# Patient Record
Sex: MALE | Race: WHITE | NOT HISPANIC OR LATINO | Employment: OTHER | URBAN - NONMETROPOLITAN AREA
[De-identification: names, ages, dates, MRNs, and addresses within clinical notes are randomized per-mention and may not be internally consistent; named-entity substitution may affect disease eponyms.]

---

## 2017-02-04 ENCOUNTER — HOSPITAL ENCOUNTER (OUTPATIENT)
Facility: HOSPITAL | Age: 67
Setting detail: OBSERVATION
Discharge: HOME | End: 2017-02-07
Attending: UROLOGY | Admitting: UROLOGY

## 2017-02-04 ENCOUNTER — APPOINTMENT (OUTPATIENT)
Dept: CT IMAGING | Facility: HOSPITAL | Age: 67
End: 2017-02-04

## 2017-02-04 DIAGNOSIS — L53.9 ERYTHEMA: ICD-10-CM

## 2017-02-04 DIAGNOSIS — R31.0 GROSS HEMATURIA: Primary | ICD-10-CM

## 2017-02-04 PROCEDURE — 25010000003 CEFAZOLIN PER 500 MG: Performed by: UROLOGY

## 2017-02-04 PROCEDURE — G0378 HOSPITAL OBSERVATION PER HR: HCPCS

## 2017-02-04 PROCEDURE — 99220 PR INITIAL OBSERVATION CARE/DAY 70 MINUTES: CPT | Performed by: UROLOGY

## 2017-02-04 PROCEDURE — 74176 CT ABD & PELVIS W/O CONTRAST: CPT

## 2017-02-04 RX ORDER — PROMETHAZINE HYDROCHLORIDE 25 MG/ML
12.5 INJECTION, SOLUTION INTRAMUSCULAR; INTRAVENOUS EVERY 4 HOURS PRN
Status: DISCONTINUED | OUTPATIENT
Start: 2017-02-04 | End: 2017-02-07 | Stop reason: HOSPADM

## 2017-02-04 RX ORDER — TAMSULOSIN HYDROCHLORIDE 0.4 MG/1
1 CAPSULE ORAL NIGHTLY
COMMUNITY

## 2017-02-04 RX ORDER — PROMETHAZINE HYDROCHLORIDE 25 MG/ML
12.5 INJECTION, SOLUTION INTRAMUSCULAR; INTRAVENOUS EVERY 6 HOURS PRN
Status: DISCONTINUED | OUTPATIENT
Start: 2017-02-04 | End: 2017-02-07 | Stop reason: HOSPADM

## 2017-02-04 RX ORDER — HYDROCODONE BITARTRATE AND ACETAMINOPHEN 7.5; 325 MG/1; MG/1
1 TABLET ORAL EVERY 4 HOURS PRN
Status: DISCONTINUED | OUTPATIENT
Start: 2017-02-04 | End: 2017-02-07 | Stop reason: HOSPADM

## 2017-02-04 RX ORDER — ATROPA BELLADONNA AND OPIUM 16.2; 6 MG/1; MG/1
60 SUPPOSITORY RECTAL EVERY 6 HOURS PRN
Status: DISCONTINUED | OUTPATIENT
Start: 2017-02-04 | End: 2017-02-07 | Stop reason: HOSPADM

## 2017-02-04 RX ORDER — SODIUM CHLORIDE 9 MG/ML
25 INJECTION, SOLUTION INTRAVENOUS CONTINUOUS
Status: DISCONTINUED | OUTPATIENT
Start: 2017-02-04 | End: 2017-02-07 | Stop reason: HOSPADM

## 2017-02-04 RX ORDER — HYDROCODONE BITARTRATE AND ACETAMINOPHEN 7.5; 325 MG/1; MG/1
1 TABLET ORAL EVERY 6 HOURS PRN
Status: DISCONTINUED | OUTPATIENT
Start: 2017-02-04 | End: 2017-02-07 | Stop reason: HOSPADM

## 2017-02-04 RX ORDER — MORPHINE SULFATE 4 MG/ML
4 INJECTION, SOLUTION INTRAMUSCULAR; INTRAVENOUS
Status: DISCONTINUED | OUTPATIENT
Start: 2017-02-04 | End: 2017-02-07 | Stop reason: HOSPADM

## 2017-02-04 RX ORDER — ZOLPIDEM TARTRATE 5 MG/1
5 TABLET ORAL NIGHTLY PRN
Status: DISCONTINUED | OUTPATIENT
Start: 2017-02-04 | End: 2017-02-07 | Stop reason: HOSPADM

## 2017-02-04 RX ORDER — NALOXONE HCL 0.4 MG/ML
0.4 VIAL (ML) INJECTION
Status: DISCONTINUED | OUTPATIENT
Start: 2017-02-04 | End: 2017-02-07 | Stop reason: HOSPADM

## 2017-02-04 RX ORDER — ONDANSETRON 4 MG/1
4 TABLET, FILM COATED ORAL EVERY 6 HOURS PRN
Status: DISCONTINUED | OUTPATIENT
Start: 2017-02-04 | End: 2017-02-07 | Stop reason: HOSPADM

## 2017-02-04 RX ORDER — HYDROCODONE BITARTRATE AND ACETAMINOPHEN 7.5; 325 MG/1; MG/1
2 TABLET ORAL EVERY 4 HOURS PRN
Status: DISCONTINUED | OUTPATIENT
Start: 2017-02-04 | End: 2017-02-07 | Stop reason: HOSPADM

## 2017-02-04 RX ORDER — BISACODYL 10 MG
10 SUPPOSITORY, RECTAL RECTAL DAILY PRN
Status: DISCONTINUED | OUTPATIENT
Start: 2017-02-04 | End: 2017-02-07 | Stop reason: HOSPADM

## 2017-02-04 RX ORDER — ONDANSETRON 2 MG/ML
4 INJECTION INTRAMUSCULAR; INTRAVENOUS EVERY 6 HOURS PRN
Status: DISCONTINUED | OUTPATIENT
Start: 2017-02-04 | End: 2017-02-07 | Stop reason: HOSPADM

## 2017-02-04 RX ORDER — DOCUSATE SODIUM 100 MG/1
100 CAPSULE, LIQUID FILLED ORAL 2 TIMES DAILY PRN
Status: DISCONTINUED | OUTPATIENT
Start: 2017-02-04 | End: 2017-02-07 | Stop reason: HOSPADM

## 2017-02-04 RX ORDER — ACETAMINOPHEN 325 MG/1
650 TABLET ORAL EVERY 4 HOURS PRN
Status: DISCONTINUED | OUTPATIENT
Start: 2017-02-04 | End: 2017-02-07 | Stop reason: HOSPADM

## 2017-02-04 RX ORDER — TAMSULOSIN HYDROCHLORIDE 0.4 MG/1
0.4 CAPSULE ORAL NIGHTLY
Status: DISCONTINUED | OUTPATIENT
Start: 2017-02-04 | End: 2017-02-07 | Stop reason: HOSPADM

## 2017-02-04 RX ORDER — ONDANSETRON 4 MG/1
4 TABLET, ORALLY DISINTEGRATING ORAL EVERY 6 HOURS PRN
Status: DISCONTINUED | OUTPATIENT
Start: 2017-02-04 | End: 2017-02-07 | Stop reason: HOSPADM

## 2017-02-04 RX ADMIN — TAMSULOSIN HYDROCHLORIDE 0.4 MG: 0.4 CAPSULE ORAL at 20:47

## 2017-02-04 RX ADMIN — SODIUM CHLORIDE 25 ML/HR: 9 INJECTION, SOLUTION INTRAVENOUS at 15:42

## 2017-02-04 NOTE — PLAN OF CARE
Problem: Patient Care Overview (Adult)  Goal: Plan of Care Review  Outcome: Ongoing (interventions implemented as appropriate)    02/04/17 1523   Coping/Psychosocial Response Interventions   Plan Of Care Reviewed With patient   Patient Care Overview   Progress progress towards functional goals is fair         02/04/17 1523   Coping/Psychosocial Response Interventions   Plan Of Care Reviewed With patient   Patient Care Overview   Progress progress towards functional goals is fair   Outcome Evaluation   Outcome Summary/Follow up Plan Pt started urinating blood clots last night, was unable to urinate this morning and went to [facility]. baron placed with return of clots and bloody urine. Upon arrival, urine is clear/pink with slow CBI gtt.        Goal: Adult Individualization and Mutuality  Outcome: Ongoing (interventions implemented as appropriate)  Goal: Discharge Needs Assessment  Outcome: Ongoing (interventions implemented as appropriate)    02/04/17 1523   Discharge Needs Assessment   Concerns To Be Addressed denies needs/concerns at this time   Readmission Within The Last 30 Days no previous admission in last 30 days   Equipment Needed After Discharge none   Discharge Disposition still a patient   Current Health   Anticipated Changes Related to Illness none   Self-Care   Equipment Currently Used at Home none   Living Environment   Transportation Available car;family or friend will provide         Problem: Pain, Acute (Adult)  Goal: Identify Related Risk Factors and Signs and Symptoms  Outcome: Ongoing (interventions implemented as appropriate)    02/04/17 1523   Pain, Acute   Related Risk Factors (Acute Pain) knowledge deficit;persistent pain   Signs and Symptoms (Acute Pain) verbalization of pain descriptors       Goal: Acceptable Pain Control/Comfort Level  Outcome: Ongoing (interventions implemented as appropriate)    02/04/17 1523   Pain, Acute (Adult)   Acceptable Pain Control/Comfort Level  making progress toward outcome

## 2017-02-04 NOTE — H&P
Urology H&P    Mr. Brennan is 66 y.o. male    CHIEF COMPLAINT: I have blood in my urine    HPI  Hematuria  Patient complains of gross hematuria. Onset of hematuria was about  2 days ago and was sudden in onset. This has not been evaluated with previous imaging. This has not been evaluated cystoscopically. The course has been worsening. Possible contributing factors to consider include None. Management of the hematuria include(s) observation which has not  improved it. Associated symptoms include  Irritative voiding symptoms he underwent a cystoscopy with bladder biopsies which showed all inflammatory changes 1 year ago  by Dr. Khalif Aceves      The following portions of the patient's history were reviewed and updated as appropriate: allergies, current medications, past family history, past medical history, past social history, past surgical history and problem list.    Review of Systems   Constitutional: Negative for appetite change and fever.   HENT: Negative for hearing loss and sore throat.    Eyes: Negative for pain and redness.   Respiratory: Negative for cough and shortness of breath.    Cardiovascular: Negative for chest pain and leg swelling.   Gastrointestinal: Negative for anal bleeding, nausea and vomiting.   Endocrine: Negative for cold intolerance and heat intolerance.   Genitourinary: Positive for hematuria and urgency. Negative for dysuria and flank pain.   Musculoskeletal: Negative for joint swelling and myalgias.   Skin: Negative for color change and rash.   Allergic/Immunologic: Negative for immunocompromised state.   Neurological: Negative for dizziness and speech difficulty.   Hematological: Negative for adenopathy. Does not bruise/bleed easily.   Psychiatric/Behavioral: Negative for dysphoric mood and suicidal ideas.       Prescriptions Prior to Admission   Medication Sig Dispense Refill Last Dose   • tamsulosin (FLOMAX) 0.4 MG capsule 24 hr capsule Take 1 capsule by mouth Every Night.   2/4/2017  at Unknown time         Current Facility-Administered Medications:   •  HYDROcodone-acetaminophen (NORCO) 7.5-325 MG per tablet 1 tablet, 1 tablet, Oral, Q6H PRN, Chuck Venegas MD  •  opium-belladonna (B&O SUPPRETTES) suppository 60 mg, 60 mg, Rectal, Q6H PRN, Chuck Venegas MD  •  tamsulosin (FLOMAX) 24 hr capsule 0.4 mg, 0.4 mg, Oral, Nightly, Chuck Venegas MD  •  zolpidem (AMBIEN) tablet 5 mg, 5 mg, Oral, Nightly PRN, Chuck Venegas MD    Past Medical History   Diagnosis Date   • BPH (benign prostatic hyperplasia)        No past surgical history on file.    Social History     Social History   • Marital status:      Spouse name: N/A   • Number of children: N/A   • Years of education: N/A     Social History Main Topics   • Smoking status: Current Some Day Smoker     Types: Cigarettes     Start date: 1930   • Smokeless tobacco: Not on file      Comment: pt states he is trying to quit and only smokes every couple of weeks   • Alcohol use Not on file   • Drug use: Not on file   • Sexual activity: Not on file     Other Topics Concern   • Not on file     Social History Narrative   • No narrative on file       No family history on file.    There were no vitals taken for this visit.    Physical Exam   Constitutional: He is oriented to person, place, and time. He appears well-developed and well-nourished. No distress.   HENT:   Nose: Nose normal.   Neck: Normal range of motion. Neck supple. No tracheal deviation present. No thyromegaly present.   Cardiovascular: Normal rate, regular rhythm and intact distal pulses.    No significant edema or tenderness    Pulmonary/Chest: Breath sounds normal. No accessory muscle usage. No respiratory distress.   Abdominal: Soft. Bowel sounds are normal. He exhibits no distension, no ascites and no mass. There is no hepatosplenomegaly. There is no tenderness. There is no rebound, no guarding and no CVA tenderness. No hernia.   Stool specimen is not indicated for my portion  of the exam   Genitourinary: Testes normal and penis normal. Rectal exam shows no mass, no tenderness, anal tone normal and guaiac negative stool. Tender: no nodules. Right testis shows no mass, no swelling and no tenderness. Left testis shows no mass, no swelling and no tenderness. No penile tenderness (no lesion or deformities).   Genitourinary Comments:  The urethral meatus normal in position without evidence of stricture. Epididymis without mass or tenderness. Vas Deferens is palpably normal.Anus and perineum without mass or tenderness. The seminal vesicle are without mass or enlargement. The prostate is approximately 25 ml. It is Symmetric, with a Soft consistency. There are no nodules present. . The seminal vesicles are Palpably normal in size and without mass.     Lymphadenopathy:     He has no cervical adenopathy. No inguinal adenopathy noted on the right or left side.        Right: No inguinal adenopathy present.        Left: No inguinal adenopathy present.   Neurological: He is alert and oriented to person, place, and time.   Skin: Skin is warm and dry. No rash noted. He is not diaphoretic. No pallor.   Psychiatric: He has a normal mood and affect. His behavior is normal.   Vitals reviewed.      Lab Results   Component Value Date    GLUCOSE 94 02/12/2016    BUN 15 02/12/2016    CREATININE 0.78 02/12/2016    CO2 26 02/12/2016    CALCIUM 8.4 02/12/2016     Lab Results   Component Value Date    GLUCOSE 94 02/12/2016    CALCIUM 8.4 02/12/2016     02/12/2016    K 4.2 02/12/2016    CO2 26 02/12/2016     02/12/2016    BUN 15 02/12/2016    CREATININE 0.78 02/12/2016    ANIONGAP 8 02/12/2016     Lab Results   Component Value Date    WBC 7.58 02/12/2016    HGB 11.8 (L) 02/12/2016    HCT 35.7 (L) 02/12/2016    MCV 86.9 02/12/2016     02/12/2016     No results found for: PSA  No results found for: URINECX  Brief Urine Lab Results     None            Imaging Results (last 7 days)     ** No results  found for the last 168 hours. **          Assessment and Plan  #1.  Gross hematuria  This sudden onset of hematuria is the first episode he had in 1 year.  He had that episode that was evaluated with cystoscopy bladder biopsy and fulguration by Dr. Aceves.  The pathology specimen showed and poor chronic cystitis with denuded but benign urothelium and areas.  The patient had had no other episodes of this until 2 days ago.  He denies flank discomfort.  I would like to further evaluate this with a CT scan done with urogram protocol.  I think he will likely need to undergo another cystoscopy but it we will increase the diagnostic yield if we can clear him now and get the catheter out.Cystoscopy as the definitive lower urinary tract study is discussed . The risks of pain and discomfort, infection, and urethral stricture are discussed with the patient including the technique used in the office setting.  All patient questions were answered.         Chuck Venegas MD  02/04/17  2:38 PM    EMR Dragon/Transcription disclaimer:  Much of this encounter note is an electronic transcription/translation of spoken language to printed text. The electronic translation of spoken language may permit erroneous, or at times, nonsensical words or phrases to be inadvertently transcribed; although I have reviewed the note for such errors, some may still exist.

## 2017-02-05 ENCOUNTER — APPOINTMENT (OUTPATIENT)
Dept: GENERAL RADIOLOGY | Facility: HOSPITAL | Age: 67
End: 2017-02-05

## 2017-02-05 LAB
ANION GAP SERPL CALCULATED.3IONS-SCNC: 5 MMOL/L (ref 4–13)
BUN BLD-MCNC: 16 MG/DL (ref 5–21)
BUN/CREAT SERPL: 20.3 (ref 7–25)
CALCIUM SPEC-SCNC: 8.1 MG/DL (ref 8.4–10.4)
CHLORIDE SERPL-SCNC: 106 MMOL/L (ref 98–110)
CO2 SERPL-SCNC: 26 MMOL/L (ref 24–31)
CREAT BLD-MCNC: 0.79 MG/DL (ref 0.5–1.4)
DEPRECATED RDW RBC AUTO: 44.7 FL (ref 40–54)
ERYTHROCYTE [DISTWIDTH] IN BLOOD BY AUTOMATED COUNT: 13.9 % (ref 12–15)
GFR SERPL CREATININE-BSD FRML MDRD: 98 ML/MIN/1.73
GLUCOSE BLD-MCNC: 96 MG/DL (ref 70–100)
HCT VFR BLD AUTO: 35 % (ref 40–52)
HGB BLD-MCNC: 11.7 G/DL (ref 14–18)
MCH RBC QN AUTO: 29.5 PG (ref 28–32)
MCHC RBC AUTO-ENTMCNC: 33.4 G/DL (ref 33–36)
MCV RBC AUTO: 88.2 FL (ref 82–95)
PLATELET # BLD AUTO: 189 10*3/MM3 (ref 130–400)
PMV BLD AUTO: 9.9 FL (ref 6–12)
POTASSIUM BLD-SCNC: 3.7 MMOL/L (ref 3.5–5.3)
RBC # BLD AUTO: 3.97 10*6/MM3 (ref 4.8–5.9)
SODIUM BLD-SCNC: 137 MMOL/L (ref 135–145)
WBC NRBC COR # BLD: 4.12 10*3/MM3 (ref 4.8–10.8)

## 2017-02-05 PROCEDURE — 80048 BASIC METABOLIC PNL TOTAL CA: CPT | Performed by: UROLOGY

## 2017-02-05 PROCEDURE — 71020 HC CHEST PA AND LATERAL: CPT

## 2017-02-05 PROCEDURE — G0378 HOSPITAL OBSERVATION PER HR: HCPCS

## 2017-02-05 PROCEDURE — 99224 PR SBSQ OBSERVATION CARE/DAY 15 MINUTES: CPT | Performed by: UROLOGY

## 2017-02-05 PROCEDURE — 85027 COMPLETE CBC AUTOMATED: CPT | Performed by: UROLOGY

## 2017-02-05 RX ADMIN — HYDROCODONE BITARTRATE AND ACETAMINOPHEN 1 TABLET: 7.5; 325 TABLET ORAL at 14:26

## 2017-02-05 RX ADMIN — TAMSULOSIN HYDROCHLORIDE 0.4 MG: 0.4 CAPSULE ORAL at 20:36

## 2017-02-05 RX ADMIN — SODIUM CHLORIDE 25 ML/HR: 9 INJECTION, SOLUTION INTRAVENOUS at 14:22

## 2017-02-05 NOTE — PLAN OF CARE
Problem: Patient Care Overview (Adult)  Goal: Plan of Care Review  Outcome: Ongoing (interventions implemented as appropriate)    02/05/17 1439   Coping/Psychosocial Response Interventions   Plan Of Care Reviewed With patient   Patient Care Overview   Progress progress towards functional goals is fair   Outcome Evaluation   Outcome Summary/Follow up Plan CT showed bladder distention/multiple clots per Dr. Venegas. Pt NPO after midnight for cysto/ possible TURBT tomorrow. Continue CBI, utine pink/clear with few small clots noted.        Goal: Adult Individualization and Mutuality  Outcome: Ongoing (interventions implemented as appropriate)  Goal: Discharge Needs Assessment  Outcome: Ongoing (interventions implemented as appropriate)    02/05/17 1439   Discharge Needs Assessment   Concerns To Be Addressed denies needs/concerns at this time   Readmission Within The Last 30 Days no previous admission in last 30 days   Equipment Needed After Discharge none   Discharge Disposition still a patient   Current Health   Anticipated Changes Related to Illness none   Self-Care   Equipment Currently Used at Home none   Living Environment   Transportation Available car;family or friend will provide         Problem: Pain, Acute (Adult)  Goal: Identify Related Risk Factors and Signs and Symptoms  Outcome: Ongoing (interventions implemented as appropriate)    02/05/17 1439   Pain, Acute   Related Risk Factors (Acute Pain) procedure/treatment;knowledge deficit   Signs and Symptoms (Acute Pain) facial mask of pain/grimace       Goal: Acceptable Pain Control/Comfort Level  Outcome: Ongoing (interventions implemented as appropriate)    02/05/17 1439   Pain, Acute (Adult)   Acceptable Pain Control/Comfort Level making progress toward outcome

## 2017-02-05 NOTE — PROGRESS NOTES
Urology  Length of Stay: 0  Patient Care Team:  Andres Tony MD as PCP - General (Geriatric Medicine)  Laron Jacobs MD as PCP - Family Medicine    Chief Complaint:  I have blood in my urine    Subjective     Interval History:   Patient with gross hematuria has done well on continuous bladder irrigation overnight.  He is had no bleeding around the catheter.  He was irrigated by hand briefly by nursing staff.  Moderate old-looking clot noted.  He denies any discomfort from bladder distention.    Review of Systems:   Review of Systems   Constitutional: Negative for chills and fever.   Gastrointestinal: Negative for abdominal pain, anal bleeding and blood in stool.   Genitourinary: Positive for hematuria. Negative for flank pain.       Objective     Vital Signs  Temp:  [97.6 °F (36.4 °C)-98.3 °F (36.8 °C)] 97.8 °F (36.6 °C)  Heart Rate:  [61-64] 61  Resp:  [16-18] 16  BP: ()/(51-73) 99/58    Physical Exam:  Physical Exam   Constitutional: He is oriented to person, place, and time. He appears well-developed and well-nourished. No distress.   Pulmonary/Chest: Effort normal.   Abdominal: Soft. He exhibits no distension and no mass. There is no tenderness. There is no rebound and no guarding. No hernia.   Neurological: He is alert and oriented to person, place, and time.   Skin: Skin is warm and dry. He is not diaphoretic.   Psychiatric: He has a normal mood and affect.   Vitals reviewed.       Results Review:       I reviewed the patient's new clinical results.  Lab Results (last 24 hours)     Procedure Component Value Units Date/Time    CBC (No Diff) [[id number]]  (Abnormal) Collected:  02/05/17 0531    Specimen:  Blood Updated:  02/05/17 0542     WBC 4.12 (L) 10*3/mm3      RBC 3.97 (L) 10*6/mm3      Hemoglobin 11.7 (L) g/dL      Hematocrit 35.0 (L) %      MCV 88.2 fL      MCH 29.5 pg      MCHC 33.4 g/dL      RDW 13.9 %      RDW-SD 44.7 fl      MPV 9.9 fL      Platelets 189 10*3/mm3     Basic Metabolic Panel  [[id number]]  (Abnormal) Collected:  02/05/17 0531    Specimen:  Blood Updated:  02/05/17 0558     Glucose 96 mg/dL      BUN 16 mg/dL      Creatinine 0.79 mg/dL      Sodium 137 mmol/L      Potassium 3.7 mmol/L      Chloride 106 mmol/L      CO2 26.0 mmol/L      Calcium 8.1 (L) mg/dL      eGFR Non African Amer 98 mL/min/1.73      BUN/Creatinine Ratio 20.3      Anion Gap 5.0 mmol/L     Narrative:       GFR Normal >60  Chronic Kidney Disease <60  Kidney Failure <15        Imaging Results (last 24 hours)     Procedure Component Value Units Date/Time    CT Abdomen Pelvis Without Contrast [[id number]] Collected:  02/04/17 1543     Updated:  02/04/17 1555    Narrative:       CT ABDOMEN AND PELVIS without contrast 2/4/2017 3:22 PM CST     HISTORY: Gross hematuria     COMPARISON: None      DLP: 550 mGy cm     In order to have a CT radiation dose as low as reasonably achievable,  Automated Exposure Control was utilized for adjustment of the mA and/or  KV according to patient size.     TECHNIQUE: Noncontrast enhanced images of the abdomen and pelvis  obtained without oral contrast.      FINDINGS:   There are dependent changes at the lung bases bilaterally. A granuloma  is seen at the RIGHT lower lobe.      LIVER: The most cephalad aspect of the liver is not included on these  images. The visualized portion of the liver is without acute finding.     BILIARY SYSTEM: The gallbladder is unremarkable. No intrahepatic or  extrahepatic ductal dilatation.      PANCREAS: No focal pancreatic lesion.      SPLEEN: There is a tiny low-density lesion along the anterior superior  aspect of the spleen which measures up to 1.4 cm in diameter. This may  represent a cyst or hemangioma but is indeterminate.      KIDNEYS AND ADRENALS: The adrenal glands are unremarkable.  Low-density  lesion in the posterior aspect of the superior pole of the LEFT kidney  measures up to 2.1 cm. This is most likely a cyst but is incompletely  characterized on this  examination. The ureters are decompressed  bilaterally.     RETROPERITONEUM: No retroperitoneal masses or adenopathy.      GI TRACT: There is diverticulosis but no diverticulitis. No bowel  obstruction. The appendix has a normal appearance.     OTHER: There is diastases of the rectus abdominis muscles at the  midline. There is a fat-containing LEFT internal hernia there is  atherosclerosis in the visualized vasculature. No definite acute osseous  findings. There is degenerative change in the SI joints and lumbar  spine. No destructive bony lesions are identified on this examination.           PELVIS: A Hicks catheter is in place within the urinary bladder. There  is high density material along the RIGHT lateral aspect of the urinary  bladder is well as layering material within the posterior dependent  portion of the urinary bladder.. There is some air in the bladder likely  related to the recent catheterization. Question a small bladder  diverticulum on the RIGHT.       Impression:       1. Distended urinary bladder with a large amount of layering material  noted dependently. Along the RIGHT lateral aspect of the urinary bladder  there is some high density material which could represent blood. An  underlying bladder neoplasm is not excluded. There is a small bladder  diverticulum on the RIGHT. No definite adjacent pelvic adenopathy.  Urological consult recommended.  2. Tiny low-density lesion in the spleen may be a cyst or hemangioma but  is indeterminate.  3. Low-density lesion in the LEFT kidney is most likely a cyst but is  incompletely characterized on this noncontrast examination. A renal  ultrasound would evaluate whether this is cystic or solid.  4. Incidental note of rectus diastases.        This report was finalized on 02/04/2017 15:53 by Dr. Santos Zhong MD.          Medication Review:     Current Facility-Administered Medications:   •  acetaminophen (TYLENOL) tablet 650 mg, 650 mg, Oral, Q4H PRN,  Chuck Venegas MD  •  bisacodyl (DULCOLAX) suppository 10 mg, 10 mg, Rectal, Daily PRN, Chuck Venegas MD  •  docusate sodium (COLACE) capsule 100 mg, 100 mg, Oral, BID PRN, Chuck Venegas MD  •  HYDROcodone-acetaminophen (NORCO) 7.5-325 MG per tablet 1 tablet, 1 tablet, Oral, Q4H PRN, Chuck Venegas MD  •  HYDROcodone-acetaminophen (NORCO) 7.5-325 MG per tablet 1 tablet, 1 tablet, Oral, Q6H PRN, Chuck Venegas MD  •  HYDROcodone-acetaminophen (NORCO) 7.5-325 MG per tablet 2 tablet, 2 tablet, Oral, Q4H PRN, Chuck Venegas MD  •  Morphine sulfate (PF) injection 4 mg, 4 mg, Intravenous, Q2H PRN **AND** naloxone (NARCAN) injection 0.4 mg, 0.4 mg, Intravenous, Q5 Min PRN, Chuck Venegas MD  •  ondansetron (ZOFRAN) tablet 4 mg, 4 mg, Oral, Q6H PRN **OR** ondansetron ODT (ZOFRAN-ODT) disintegrating tablet 4 mg, 4 mg, Oral, Q6H PRN **OR** ondansetron (ZOFRAN) injection 4 mg, 4 mg, Intravenous, Q6H PRN, Chuck Venegas MD  •  opium-belladonna (B&O SUPPRETTES) suppository 60 mg, 60 mg, Rectal, Q6H PRN, Chuck Venegas MD  •  promethazine (PHENERGAN) injection 12.5 mg, 12.5 mg, Intravenous, Q6H PRN **OR** promethazine (PHENERGAN) injection 12.5 mg, 12.5 mg, Intramuscular, Q4H PRN, Chuck Venegas MD  •  sodium chloride 0.9 % infusion, 25 mL/hr, Intravenous, Continuous, Chuck Venegas MD, Last Rate: 25 mL/hr at 02/04/17 1542, 25 mL/hr at 02/04/17 1542  •  tamsulosin (FLOMAX) 24 hr capsule 0.4 mg, 0.4 mg, Oral, Nightly, Chuck Venegas MD, 0.4 mg at 02/04/17 2047  •  zolpidem (AMBIEN) tablet 5 mg, 5 mg, Oral, Nightly PRN, Chuck Venegas MD  Independent review of CT scan of the abdomen/pelvis The patient has undergone a CT scan of the abdomen and pelvis With contrast. The images are available for me to review as an independent interpretation for evaluation and management.  Assessment of the renal parenchyma with regards to thickness, scarring, symmetry in appearance and function, presence of masses both  pre-and postcontrast, and calcifications are noted.  The collecting system with regard to dilatation, presence of calcifications, and masses were reviewed.  The course and caliber the ureters also noted.  The renal vessels and retroperitoneum is inspected for pathology.  The solid viscera and bowel pattern are briefly reviewed, but will also be inspected by the radiologist. The renal pelvis is inspected.  This study shows markedly distended bladder with some areas there hyperattenuating concerning for fresh clot versus bladder mass, potential neoplasm.  He has a history of negative biopsies that were done by Dr. Khalif Aceves last year..    Assessment/Plan:   #1.  Gross hematuria   #2.  Urinary retention secondary to clot in urinary bladder  Patient is just eaten lunch.  He needs a cystoscopy clot evacuation with catheter is draining well.  We will put that on for tomorrow afternoon.  He will need to be changed to an inpatient admission because he will likely be here until Tuesday 2/7/17      Chuck Venegas MD  02/05/17  12:30 PM

## 2017-02-05 NOTE — PLAN OF CARE
Problem: Patient Care Overview (Adult)  Goal: Plan of Care Review  Outcome: Ongoing (interventions implemented as appropriate)    02/05/17 0321   Coping/Psychosocial Response Interventions   Plan Of Care Reviewed With patient   Patient Care Overview   Progress progress toward functional goals as expected   Outcome Evaluation   Outcome Summary/Follow up Plan pt still having hematuria. CBI slow gtt. few clots noted         Problem: Pain, Acute (Adult)  Goal: Identify Related Risk Factors and Signs and Symptoms  Outcome: Ongoing (interventions implemented as appropriate)  Goal: Acceptable Pain Control/Comfort Level  Outcome: Ongoing (interventions implemented as appropriate)

## 2017-02-06 ENCOUNTER — ANESTHESIA (OUTPATIENT)
Dept: PERIOP | Facility: HOSPITAL | Age: 67
End: 2017-02-06

## 2017-02-06 ENCOUNTER — ANESTHESIA EVENT (OUTPATIENT)
Dept: PERIOP | Facility: HOSPITAL | Age: 67
End: 2017-02-06

## 2017-02-06 PROCEDURE — 25010000002 MIDAZOLAM PER 1 MG: Performed by: ANESTHESIOLOGY

## 2017-02-06 PROCEDURE — C1758 CATHETER, URETERAL: HCPCS | Performed by: UROLOGY

## 2017-02-06 PROCEDURE — 93005 ELECTROCARDIOGRAM TRACING: CPT | Performed by: UROLOGY

## 2017-02-06 PROCEDURE — 25010000002 FENTANYL CITRATE (PF) 100 MCG/2ML SOLUTION: Performed by: NURSE ANESTHETIST, CERTIFIED REGISTERED

## 2017-02-06 PROCEDURE — 25010000003 CEFAZOLIN PER 500 MG: Performed by: UROLOGY

## 2017-02-06 PROCEDURE — 25010000002 HYDROMORPHONE PER 4 MG: Performed by: ANESTHESIOLOGY

## 2017-02-06 PROCEDURE — 93010 ELECTROCARDIOGRAM REPORT: CPT | Performed by: INTERNAL MEDICINE

## 2017-02-06 PROCEDURE — 25010000002 SUCCINYLCHOLINE PER 20 MG: Performed by: NURSE ANESTHETIST, CERTIFIED REGISTERED

## 2017-02-06 PROCEDURE — 25010000002 DEXAMETHASONE PER 1 MG: Performed by: NURSE ANESTHETIST, CERTIFIED REGISTERED

## 2017-02-06 PROCEDURE — 88305 TISSUE EXAM BY PATHOLOGIST: CPT | Performed by: UROLOGY

## 2017-02-06 PROCEDURE — 25010000002 ONDANSETRON PER 1 MG: Performed by: NURSE ANESTHETIST, CERTIFIED REGISTERED

## 2017-02-06 PROCEDURE — 25010000002 GENTAMICIN PER 80 MG: Performed by: UROLOGY

## 2017-02-06 PROCEDURE — 52214 CYSTOSCOPY AND TREATMENT: CPT | Performed by: UROLOGY

## 2017-02-06 PROCEDURE — G0378 HOSPITAL OBSERVATION PER HR: HCPCS

## 2017-02-06 PROCEDURE — 25010000002 PROPOFOL 10 MG/ML EMULSION: Performed by: NURSE ANESTHETIST, CERTIFIED REGISTERED

## 2017-02-06 RX ORDER — ONDANSETRON 2 MG/ML
4 INJECTION INTRAMUSCULAR; INTRAVENOUS ONCE AS NEEDED
Status: DISCONTINUED | OUTPATIENT
Start: 2017-02-06 | End: 2017-02-06 | Stop reason: HOSPADM

## 2017-02-06 RX ORDER — HYDRALAZINE HYDROCHLORIDE 20 MG/ML
5 INJECTION INTRAMUSCULAR; INTRAVENOUS
Status: DISCONTINUED | OUTPATIENT
Start: 2017-02-06 | End: 2017-02-06 | Stop reason: HOSPADM

## 2017-02-06 RX ORDER — MORPHINE SULFATE 2 MG/ML
2 INJECTION, SOLUTION INTRAMUSCULAR; INTRAVENOUS
Status: DISCONTINUED | OUTPATIENT
Start: 2017-02-06 | End: 2017-02-06 | Stop reason: HOSPADM

## 2017-02-06 RX ORDER — NALOXONE HCL 0.4 MG/ML
0.4 VIAL (ML) INJECTION AS NEEDED
Status: DISCONTINUED | OUTPATIENT
Start: 2017-02-06 | End: 2017-02-06 | Stop reason: HOSPADM

## 2017-02-06 RX ORDER — FENTANYL CITRATE 50 UG/ML
INJECTION, SOLUTION INTRAMUSCULAR; INTRAVENOUS AS NEEDED
Status: DISCONTINUED | OUTPATIENT
Start: 2017-02-06 | End: 2017-02-06 | Stop reason: SURG

## 2017-02-06 RX ORDER — MAGNESIUM HYDROXIDE 1200 MG/15ML
LIQUID ORAL AS NEEDED
Status: DISCONTINUED | OUTPATIENT
Start: 2017-02-06 | End: 2017-02-06 | Stop reason: HOSPADM

## 2017-02-06 RX ORDER — DEXTROSE MONOHYDRATE 25 G/50ML
12.5 INJECTION, SOLUTION INTRAVENOUS AS NEEDED
Status: DISCONTINUED | OUTPATIENT
Start: 2017-02-06 | End: 2017-02-06 | Stop reason: HOSPADM

## 2017-02-06 RX ORDER — FENTANYL CITRATE 50 UG/ML
25 INJECTION, SOLUTION INTRAMUSCULAR; INTRAVENOUS
Status: DISCONTINUED | OUTPATIENT
Start: 2017-02-06 | End: 2017-02-06 | Stop reason: HOSPADM

## 2017-02-06 RX ORDER — LABETALOL HYDROCHLORIDE 5 MG/ML
5 INJECTION, SOLUTION INTRAVENOUS
Status: DISCONTINUED | OUTPATIENT
Start: 2017-02-06 | End: 2017-02-06 | Stop reason: HOSPADM

## 2017-02-06 RX ORDER — DIPHENHYDRAMINE HYDROCHLORIDE 50 MG/ML
12.5 INJECTION INTRAMUSCULAR; INTRAVENOUS
Status: DISCONTINUED | OUTPATIENT
Start: 2017-02-06 | End: 2017-02-06 | Stop reason: HOSPADM

## 2017-02-06 RX ORDER — GLYCOPYRROLATE 0.2 MG/ML
INJECTION INTRAMUSCULAR; INTRAVENOUS AS NEEDED
Status: DISCONTINUED | OUTPATIENT
Start: 2017-02-06 | End: 2017-02-06

## 2017-02-06 RX ORDER — SODIUM CHLORIDE 0.9 % (FLUSH) 0.9 %
1-10 SYRINGE (ML) INJECTION AS NEEDED
Status: DISCONTINUED | OUTPATIENT
Start: 2017-02-06 | End: 2017-02-06 | Stop reason: HOSPADM

## 2017-02-06 RX ORDER — MIDAZOLAM HYDROCHLORIDE 1 MG/ML
1 INJECTION INTRAMUSCULAR; INTRAVENOUS
Status: DISCONTINUED | OUTPATIENT
Start: 2017-02-06 | End: 2017-02-06 | Stop reason: HOSPADM

## 2017-02-06 RX ORDER — MEPERIDINE HYDROCHLORIDE 25 MG/ML
12.5 INJECTION INTRAMUSCULAR; INTRAVENOUS; SUBCUTANEOUS
Status: DISCONTINUED | OUTPATIENT
Start: 2017-02-06 | End: 2017-02-06 | Stop reason: HOSPADM

## 2017-02-06 RX ORDER — SODIUM CHLORIDE, SODIUM LACTATE, POTASSIUM CHLORIDE, CALCIUM CHLORIDE 600; 310; 30; 20 MG/100ML; MG/100ML; MG/100ML; MG/100ML
9 INJECTION, SOLUTION INTRAVENOUS CONTINUOUS
Status: DISCONTINUED | OUTPATIENT
Start: 2017-02-06 | End: 2017-02-06

## 2017-02-06 RX ORDER — DEXAMETHASONE SODIUM PHOSPHATE 4 MG/ML
INJECTION, SOLUTION INTRA-ARTICULAR; INTRALESIONAL; INTRAMUSCULAR; INTRAVENOUS; SOFT TISSUE AS NEEDED
Status: DISCONTINUED | OUTPATIENT
Start: 2017-02-06 | End: 2017-02-06 | Stop reason: SURG

## 2017-02-06 RX ORDER — ONDANSETRON 2 MG/ML
INJECTION INTRAMUSCULAR; INTRAVENOUS AS NEEDED
Status: DISCONTINUED | OUTPATIENT
Start: 2017-02-06 | End: 2017-02-06 | Stop reason: SURG

## 2017-02-06 RX ORDER — LIDOCAINE HYDROCHLORIDE 20 MG/ML
INJECTION, SOLUTION INFILTRATION; PERINEURAL AS NEEDED
Status: DISCONTINUED | OUTPATIENT
Start: 2017-02-06 | End: 2017-02-06 | Stop reason: SURG

## 2017-02-06 RX ORDER — PROPOFOL 10 MG/ML
VIAL (ML) INTRAVENOUS AS NEEDED
Status: DISCONTINUED | OUTPATIENT
Start: 2017-02-06 | End: 2017-02-06 | Stop reason: SURG

## 2017-02-06 RX ORDER — METOPROLOL TARTRATE 5 MG/5ML
2.5 INJECTION INTRAVENOUS
Status: DISCONTINUED | OUTPATIENT
Start: 2017-02-06 | End: 2017-02-06 | Stop reason: HOSPADM

## 2017-02-06 RX ORDER — MIDAZOLAM HYDROCHLORIDE 1 MG/ML
2 INJECTION INTRAMUSCULAR; INTRAVENOUS
Status: DISCONTINUED | OUTPATIENT
Start: 2017-02-06 | End: 2017-02-06 | Stop reason: HOSPADM

## 2017-02-06 RX ORDER — SUCCINYLCHOLINE CHLORIDE 20 MG/ML
INJECTION INTRAMUSCULAR; INTRAVENOUS AS NEEDED
Status: DISCONTINUED | OUTPATIENT
Start: 2017-02-06 | End: 2017-02-06 | Stop reason: SURG

## 2017-02-06 RX ORDER — IPRATROPIUM BROMIDE AND ALBUTEROL SULFATE 2.5; .5 MG/3ML; MG/3ML
3 SOLUTION RESPIRATORY (INHALATION) ONCE AS NEEDED
Status: DISCONTINUED | OUTPATIENT
Start: 2017-02-06 | End: 2017-02-06 | Stop reason: HOSPADM

## 2017-02-06 RX ORDER — ROCURONIUM BROMIDE 10 MG/ML
INJECTION, SOLUTION INTRAVENOUS AS NEEDED
Status: DISCONTINUED | OUTPATIENT
Start: 2017-02-06 | End: 2017-02-06 | Stop reason: SURG

## 2017-02-06 RX ORDER — GENTAMICIN SULFATE 40 MG/ML
80 INJECTION, SOLUTION INTRAMUSCULAR; INTRAVENOUS ONCE
Status: COMPLETED | OUTPATIENT
Start: 2017-02-06 | End: 2017-02-06

## 2017-02-06 RX ORDER — SORBITOL 30 G/1000ML
IRRIGANT IRRIGATION AS NEEDED
Status: DISCONTINUED | OUTPATIENT
Start: 2017-02-06 | End: 2017-02-06 | Stop reason: HOSPADM

## 2017-02-06 RX ADMIN — MIDAZOLAM HYDROCHLORIDE 2 MG: 1 INJECTION, SOLUTION INTRAMUSCULAR; INTRAVENOUS at 15:11

## 2017-02-06 RX ADMIN — HYDROMORPHONE HYDROCHLORIDE 0.2 MG: 1 INJECTION, SOLUTION INTRAMUSCULAR; INTRAVENOUS; SUBCUTANEOUS at 17:56

## 2017-02-06 RX ADMIN — DEXAMETHASONE SODIUM PHOSPHATE 4 MG: 4 INJECTION, SOLUTION INTRAMUSCULAR; INTRAVENOUS at 16:24

## 2017-02-06 RX ADMIN — HYDROMORPHONE HYDROCHLORIDE 0.2 MG: 1 INJECTION, SOLUTION INTRAMUSCULAR; INTRAVENOUS; SUBCUTANEOUS at 18:06

## 2017-02-06 RX ADMIN — PROPOFOL 200 MG: 10 INJECTION, EMULSION INTRAVENOUS at 16:18

## 2017-02-06 RX ADMIN — SODIUM CHLORIDE, POTASSIUM CHLORIDE, SODIUM LACTATE AND CALCIUM CHLORIDE 9 ML/HR: 600; 310; 30; 20 INJECTION, SOLUTION INTRAVENOUS at 15:11

## 2017-02-06 RX ADMIN — SUCCINYLCHOLINE CHLORIDE 120 MG: 20 INJECTION, SOLUTION INTRAMUSCULAR; INTRAVENOUS at 16:18

## 2017-02-06 RX ADMIN — HYDROMORPHONE HYDROCHLORIDE 0.2 MG: 1 INJECTION, SOLUTION INTRAMUSCULAR; INTRAVENOUS; SUBCUTANEOUS at 18:01

## 2017-02-06 RX ADMIN — ROCURONIUM BROMIDE 5 MG: 10 INJECTION INTRAVENOUS at 16:18

## 2017-02-06 RX ADMIN — FENTANYL CITRATE 100 MCG: 50 INJECTION INTRAMUSCULAR; INTRAVENOUS at 16:18

## 2017-02-06 RX ADMIN — HYDROMORPHONE HYDROCHLORIDE 0.2 MG: 1 INJECTION, SOLUTION INTRAMUSCULAR; INTRAVENOUS; SUBCUTANEOUS at 18:11

## 2017-02-06 RX ADMIN — FENTANYL CITRATE 150 MCG: 50 INJECTION INTRAMUSCULAR; INTRAVENOUS at 16:20

## 2017-02-06 RX ADMIN — CEFAZOLIN 2 G: 1 INJECTION, POWDER, FOR SOLUTION INTRAVENOUS at 16:24

## 2017-02-06 RX ADMIN — ONDANSETRON HYDROCHLORIDE 4 MG: 2 SOLUTION INTRAMUSCULAR; INTRAVENOUS at 16:30

## 2017-02-06 RX ADMIN — HYDROMORPHONE HYDROCHLORIDE 0.2 MG: 1 INJECTION, SOLUTION INTRAMUSCULAR; INTRAVENOUS; SUBCUTANEOUS at 18:16

## 2017-02-06 RX ADMIN — LIDOCAINE HYDROCHLORIDE 0.5 ML: 10 INJECTION, SOLUTION EPIDURAL; INFILTRATION; INTRACAUDAL; PERINEURAL at 15:11

## 2017-02-06 RX ADMIN — ROCURONIUM BROMIDE 15 MG: 10 INJECTION INTRAVENOUS at 16:20

## 2017-02-06 RX ADMIN — GENTAMICIN SULFATE 80 MG: 40 INJECTION, SOLUTION INTRAMUSCULAR; INTRAVENOUS at 16:24

## 2017-02-06 RX ADMIN — LIDOCAINE HYDROCHLORIDE 40 MG: 20 INJECTION, SOLUTION INFILTRATION; PERINEURAL at 16:18

## 2017-02-06 RX ADMIN — SODIUM CHLORIDE 25 ML/HR: 9 INJECTION, SOLUTION INTRAVENOUS at 20:20

## 2017-02-06 NOTE — ANESTHESIA PREPROCEDURE EVALUATION
Anesthesia Evaluation     Patient summary reviewed    No history of anesthetic complications   Airway   Mallampati: II  TM distance: >3 FB  Neck ROM: full  Dental    (+) upper dentures and lower dentures    Pulmonary    (+) hx of smoking,   (-) COPD, asthma, sleep apnea  Cardiovascular   Exercise tolerance: good (4-7 METS)  (-) pacemaker, past MI, angina, cardiac stents    ECG reviewed    Neuro/Psych  (-) seizures, CVA  GI/Hepatic/Renal/Endo    (-) GERD, liver disease, renal disease, diabetes    Musculoskeletal     Abdominal    Substance History      OB/GYN          Other                             Anesthesia Plan    ASA 2     general     intravenous induction   Anesthetic plan and risks discussed with patient.

## 2017-02-06 NOTE — ANESTHESIA POSTPROCEDURE EVALUATION
Patient: Duarte Brennan    Procedure Summary     Date Anesthesia Start Anesthesia Stop Room / Location    02/06/17 1615 1709 [facility] OR 01 / [facility]       Procedure Diagnosis Surgeon Provider    CYSTOSCOPY Bladder biopsies, Fulgaration active bleeding trigon. (N/A Bladder) Gross hematuria  (Gross hematuria [R31.0]) MD Zachariah Vee CRNA          Anesthesia Type: general  Last vitals  /62 (02/06/17 1737)    Temp 97.4 °F (36.3 °C) (02/06/17 1707)    Pulse 50 (02/06/17 1737)   Resp 8 (02/06/17 1737)    SpO2 97 % (02/06/17 1737)      Post Anesthesia Care and Evaluation    Patient location during evaluation: PACU  Patient participation: complete - patient participated  Level of consciousness: awake and alert  Pain score: 0  Pain management: adequate  Airway patency: patent  Anesthetic complications: No anesthetic complications  PONV Status: none  Cardiovascular status: acceptable  Respiratory status: acceptable  Hydration status: acceptable

## 2017-02-06 NOTE — ANESTHESIA PROCEDURE NOTES
Airway  Urgency: elective    Airway not difficult    General Information and Staff    Patient location during procedure: OR  CRNA: MARLEN KUNZ    Indications and Patient Condition  Indications for airway management: airway protection    Preoxygenated: yes  MILS not maintained throughout  Mask difficulty assessment: 1 - vent by mask    Final Airway Details  Final airway type: endotracheal airway      Successful airway: ETT  Cuffed: yes   Successful intubation technique: direct laryngoscopy  Facilitating devices/methods: intubating stylet  Endotracheal tube insertion site: oral  Blade: Frey  Blade size: #2  ETT size: 8.0 mm  Cormack-Lehane Classification: grade I - full view of glottis  Placement verified by: capnometry   Measured from: lips  ETT to lips (cm): 23  Number of attempts at approach: 1

## 2017-02-07 VITALS
TEMPERATURE: 97.6 F | SYSTOLIC BLOOD PRESSURE: 127 MMHG | OXYGEN SATURATION: 97 % | WEIGHT: 200 LBS | HEIGHT: 69 IN | DIASTOLIC BLOOD PRESSURE: 60 MMHG | HEART RATE: 52 BPM | RESPIRATION RATE: 20 BRPM | BODY MASS INDEX: 29.62 KG/M2

## 2017-02-07 PROCEDURE — 99217 PR OBSERVATION CARE DISCHARGE MANAGEMENT: CPT | Performed by: UROLOGY

## 2017-02-07 PROCEDURE — G0378 HOSPITAL OBSERVATION PER HR: HCPCS

## 2017-02-07 RX ORDER — CEPHALEXIN 500 MG/1
500 CAPSULE ORAL 3 TIMES DAILY
Qty: 30 CAPSULE | Refills: 0 | Status: SHIPPED | OUTPATIENT
Start: 2017-02-07 | End: 2017-02-17

## 2017-02-07 RX ORDER — HYDROCODONE BITARTRATE AND ACETAMINOPHEN 7.5; 325 MG/1; MG/1
1 TABLET ORAL EVERY 8 HOURS PRN
Qty: 12 TABLET | Refills: 0 | Status: SHIPPED | OUTPATIENT
Start: 2017-02-07

## 2017-02-07 RX ADMIN — HYDROCODONE BITARTRATE AND ACETAMINOPHEN 1 TABLET: 7.5; 325 TABLET ORAL at 08:26

## 2017-02-07 NOTE — OP NOTE
Operative Summary    Duarte Brennan  Date of Procedure: 2/4/2017 - 2/6/2017    Pre-op Diagnosis:   Gross hematuria [R31.0]    Post-op Diagnosis:     Post-Op Diagnosis Codes:     * Gross hematuria [R31.0]    Procedure/CPT® Codes:      Procedure(s):  CYSTOSCOPY Bladder biopsies, Fulgaration active bleeding trigone.    Surgeon(s):  Chuck Venegas MD    Anesthesia: General    Staff:   Circulator: Paulie Nye RN  Scrub Person: Melissa Sol; Hannah Gordillo; Sarah Burgess    Indications for procedure:  66-year-old white male with recurrent episodes of gross hematuria presented with retention secondary to clot in the bladder.  One year ago had similar symptoms and underwent cystoscopy with bladder biopsy which revealed only chronic cystitis changes.    Procedure details:  The patient is identified in the preoperative holding area.  The informed consent process had been completed In the office documented by my last progress note that included a discussion of the risks of this procedure, alternative management options, and the potential benefits of this procedure.  The patient voiced a good recollection of that discussion.  The patient voiced no additional questions.     The patient is taken to the cystoscopic suite and given effective general anesthesia. The patient is then placed in theBody position: dorsal lithotomy position with care being placed to appropriately pad the patient to avoid excessive compression with the William stirrups, especially laterally to the leg on the peroneal nerve.  The patient is then prepped and draped in the usual sterile fashion.  At this point appropriate timeout protocol was observed.    A 25French cystoscope sheath with 30° lens is inserted.  The anterior urethra shows some clot as well as prostatic urethra which shows mild bilobar lateral enlargement.  There really is no obstruction and no active bleeding.  Upon entering the bladder a moderate amount of blood  "clot is noted and removed with a piston syringe.  This is followed by close 70° lens inspection of the bladder.  He has a rather significant \"trigonitis\" that has active bleeding.  I began by biopsying this area and then fulgurating it.  The biopsy specimen was labeled as trigone but it does appear to be inflammatory.    On close inspection of the bladder on the right lateral posterior aspect of the bladder above the trigone and near a diverticulum was an area of erythema that had some active bleeding although not severe.  I used cold cup biopsy forceps to biopsy this several times and send that to the pathologist.  I then fulgurated this with the Bugbee electrode.  There was also an area of erythema in the left portion of the dome and another in more of the anterior dome which were each biopsied in the area fulgurated the largest area fulgurated would be up approximately 2 x 2 centimeter located on the trigone.    Lastly I had planned on doing retrograde ureteropyelograms but he has such a significant trigonitis I cannot identify the orifices.   Estimated Blood Loss: 30mL    Specimens:                  ID Type Source Tests Collected by Time Destination   A : Right posterior erythema Tissue Urinary Bladder TISSUE EXAM Chuck Venegas MD 2/6/2017 1652    B : Trigon for permanent Tissue Urinary Bladder TISSUE EXAM Chuck Venegas MD 2/6/2017 1655    C : Left dome for permanent Tissue Urinary Bladder TISSUE EXAM Chuck Venegas MD 2/6/2017 1656    D : Dome for permanent Tissue Urinary Bladder TISSUE EXAM Chuck Venegas MD 2/6/2017 1658          Drains:   Urethral Catheter 02/06/17 1653 100% silicone 18 10 10 (Active)   Daily Indications Selected surgeries ( tract, abdomen) 2/6/2017  5:52 PM   Securement secured to upper leg with adhesive device 2/6/2017  5:52 PM   Irrigation/Instillation Type Catheter continuous irrigation with;sterile normal saline 2/6/2017  5:07 PM   Irrigation/Instillation Indication patency " maintenance 2/6/2017  5:07 PM   Irrigation Return clear;pink;light 2/6/2017  5:52 PM   Tolerance no signs/symptoms of discomfort 2/6/2017  5:07 PM           Complications: none    Plan: His biopsies were done and they are pending.  I will notify Dr. Aceves that this procedure was done.    Chuck Venegas MD     Date: 2/6/2017  Time: 6:19 PM    EMR Dragon/Transcription disclaimer:  Much of this encounter note is an electronic transcription/translation of spoken language to printed text. The electronic translation of spoken language may permit erroneous, or at times, nonsensical words or phrases to be inadvertently transcribed; although I have reviewed the note for such errors, some may still exist.

## 2017-02-07 NOTE — PROGRESS NOTES
Discharge Planning Assessment  [facility]     Patient Name: Duarte Brennan  MRN: [id number]  Today's Date: 2/7/2017    Admit Date: 2/4/2017          Discharge Needs Assessment       02/07/17 0952    Living Environment    Provides Primary Care For no one    Quality Of Family Relationships supportive    Able to Return to Prior Living Arrangements yes    Discharge Needs Assessment    Concerns To Be Addressed patient refuses services    Discharge Disposition home or self-care    Discharge Planning Comments PT LIVES WITH SPOUSE AND DENIES DC NEEDS. PLAN IS FOR HOME AT [place].             Discharge Plan     None        Discharge Placement     No information found        Expected Discharge Date and Time     Expected Discharge Date Expected Discharge Time    Feb 7, 2017               Demographic Summary     None            Functional Status     None            Psychosocial     None            Abuse/Neglect     None            Legal     None            Substance Abuse     None            Patient Forms     None          NILDA Lozano

## 2017-02-07 NOTE — PLAN OF CARE
Problem: Patient Care Overview (Adult)  Goal: Plan of Care Review  Outcome: Ongoing (interventions implemented as appropriate)    02/07/17 0416   Coping/Psychosocial Response Interventions   Plan Of Care Reviewed With patient   Patient Care Overview   Progress no change   Outcome Evaluation   Outcome Summary/Follow up Plan 3 way baron to BSD with CBI infusing at very slow rate, output clear yellow to pink, no clots noted; IVF infusing; denies pain.       Goal: Adult Individualization and Mutuality  Outcome: Ongoing (interventions implemented as appropriate)  Goal: Discharge Needs Assessment  Outcome: Ongoing (interventions implemented as appropriate)    Problem: Pain, Acute (Adult)  Goal: Identify Related Risk Factors and Signs and Symptoms  Outcome: Ongoing (interventions implemented as appropriate)  Goal: Acceptable Pain Control/Comfort Level  Outcome: Ongoing (interventions implemented as appropriate)    Problem: Perioperative Period (Adult)  Goal: Signs and Symptoms of Listed Potential Problems Will be Absent or Manageable (Perioperative Period)  Outcome: Ongoing (interventions implemented as appropriate)

## 2017-02-07 NOTE — DISCHARGE SUMMARY
Date of Discharge:  2/7/2017    Discharge Diagnosis:   #1.  Urinary retention secondary to clot in the bladder  #2.  Gross hematuria  #3.   Erythematous bladder mucosa with trigonitis-possible neoplasm with biopsies pending    Presenting Problem/History of Present Illness  Gross hematuria [R31.0]  Gross hematuria [R31.0]   Baldder Erythema ? Etiology- possible neoplasm.   Hospital Course  Patient is a 66 y.o. male presented with gross hematuria that is recurrent.  He had a similar episode 1 year ago and had a negative biopsy.  I did a cystoscopy on him which was only given remarkable for a very distended bladder with what appeared to be a large amount of clot.  I took him to the operating room after continuous bladder irrigation did not resolve the amount of clot he had in the bladder.    Bladder biopsies were done.  He has what appears to be a severe trigonitis as well as erythema of the bladder.  Biopsies were done and pending at the time of dictation.  The catheter was removed on the first postoperative day.  After voiding he will be discharged home..      Procedures Performed  Procedure(s):  CYSTOSCOPY Bladder biopsies, Fulgaration active bleeding trigon.       Consults:   Consults     No orders found from 1/6/2017 to 2/5/2017.            Condition on Discharge:  Improved    Vital Signs  Temp:  [97.4 °F (36.3 °C)-98.3 °F (36.8 °C)] 97.6 °F (36.4 °C)  Heart Rate:  [45-69] 52  Resp:  [8-20] 20  BP: ()/(53-67) 127/60      Discharge Disposition  Home or Self Care    Discharge Medications   Duarte Brennan   Home Medication Instructions AFUA:[id number]    Printed on:02/07/17 1001   Medication Information                      cephalexin (KEFLEX) 500 MG capsule  Take 1 capsule by mouth 3 (Three) Times a Day for 10 days.             HYDROcodone-acetaminophen (NORCO) 7.5-325 MG per tablet  Take 1 tablet by mouth Every 8 (Eight) Hours As Needed for moderate pain (4-6).             tamsulosin (FLOMAX) 0.4 MG  capsule 24 hr capsule  Take 1 capsule by mouth Every Night.                 Discharge Diet:   Diet Instructions     Diet: Regular; Thin Liquids, No Restrictions       Discharge Diet:  Regular   Fluid Consistency:  Thin Liquids, No Restrictions                 Activity at Discharge:   Activity Instructions     Discharge Activity Restrictions       1) No driving for 1 day and no longer taking narcotics.   2) Return to school / work in 3 days.  3) May shower / sponge bathe today  4) Do not lift / push / pull more then 10 lbs for 48 hours.                 Follow-up Appointments  Future Appointments  Date Time Provider Department Center   2/14/2017 9:20 AM Kehinde Aceves MD [facility] None     Referrals and Follow-ups to Schedule     Call MD With Problems / Concerns    As directed    Instructions: For temperature greater 101, inability to urinate, persistent nausea with vomiting.  You should expect blood-tinged urine for up to 2 weeks intermittently.  Contact us if having difficulty urinating due to the size of blood clots.       Follow-Up    As directed    Follow Up Details:  DR CHARIS ACEVES, [facility] 1 week                 Test Results Pending at Discharge   Order Current Status    Tissue Exam In process           Chuck Venegas MD  02/07/17  10:01 AM    Time: Discharge 15 min

## 2017-02-08 LAB
CYTO UR: NORMAL
LAB AP CASE REPORT: NORMAL
LAB AP CLINICAL INFORMATION: NORMAL
Lab: NORMAL
PATH REPORT.FINAL DX SPEC: NORMAL
PATH REPORT.GROSS SPEC: NORMAL

## 2017-02-14 ENCOUNTER — OFFICE VISIT (OUTPATIENT)
Dept: UROLOGY | Facility: CLINIC | Age: 67
End: 2017-02-14

## 2017-02-14 VITALS
WEIGHT: 196.2 LBS | SYSTOLIC BLOOD PRESSURE: 160 MMHG | HEIGHT: 69 IN | TEMPERATURE: 97.6 F | DIASTOLIC BLOOD PRESSURE: 92 MMHG | BODY MASS INDEX: 29.06 KG/M2

## 2017-02-14 DIAGNOSIS — R31.0 GROSS HEMATURIA: Primary | ICD-10-CM

## 2017-02-14 PROCEDURE — 99212 OFFICE O/P EST SF 10 MIN: CPT | Performed by: UROLOGY

## 2017-02-14 NOTE — PROGRESS NOTES
Mr. Brennan is 66 y.o. male    Chief Complaint   Patient presents with   • Gross hematuria       Blood in Urine   This is a recurrent problem. The current episode started in the past 7 days. The problem has been resolved since onset. He describes the hematuria as gross hematuria. The hematuria occurs throughout his entire urinary stream. He is experiencing no pain. He describes his urine color as dark red. Irritative symptoms do not include frequency or urgency. Pertinent negatives include no abdominal pain, chills, dysuria, fever or flank pain.       The following portions of the patient's history were reviewed and updated as appropriate: allergies, current medications, past family history, past medical history, past social history, past surgical history and problem list.    Review of Systems   Constitutional: Negative for chills and fever.   HENT: Negative for congestion and sore throat.    Eyes: Negative for pain and itching.   Respiratory: Negative for cough and shortness of breath.    Cardiovascular: Negative for chest pain.   Gastrointestinal: Negative for abdominal pain, anal bleeding and blood in stool.   Endocrine: Negative for cold intolerance and heat intolerance.   Genitourinary: Positive for hematuria. Negative for decreased urine volume, difficulty urinating, discharge, dysuria, enuresis, flank pain, frequency, genital sores, penile pain, penile swelling, scrotal swelling, testicular pain and urgency.   Musculoskeletal: Negative for back pain and neck pain.   Skin: Negative for color change and rash.   Allergic/Immunologic: Negative for food allergies.   Neurological: Negative for dizziness and headaches.   Hematological: Does not bruise/bleed easily.   Psychiatric/Behavioral: Negative for confusion and sleep disturbance.         Current Outpatient Prescriptions:   •  cephalexin (KEFLEX) 500 MG capsule, Take 1 capsule by mouth 3 (Three) Times a Day for 10 days., Disp: 30 capsule, Rfl: 0  •   "HYDROcodone-acetaminophen (NORCO) 7.5-325 MG per tablet, Take 1 tablet by mouth Every 8 (Eight) Hours As Needed for moderate pain (4-6)., Disp: 12 tablet, Rfl: 0  •  tamsulosin (FLOMAX) 0.4 MG capsule 24 hr capsule, Take 1 capsule by mouth Every Night., Disp: , Rfl:     Past Medical History   Diagnosis Date   • BPH (benign prostatic hyperplasia)    • Parkinson disease        Past Surgical History   Procedure Laterality Date   • Transurethral resection of bladder tumor N/A 2/6/2017     Procedure: CYSTOSCOPY Bladder biopsies, Fulgaration active bleeding trigon.;  Surgeon: Chuck Venegas MD;  Location: [facility] OR;  Service:        Social History     Social History   • Marital status:      Spouse name: N/A   • Number of children: N/A   • Years of education: N/A     Social History Main Topics   • Smoking status: Current Some Day Smoker     Types: Cigarettes     Start date: 1930   • Smokeless tobacco: None      Comment: pt states he is trying to quit and only smokes every couple of weeks   • Alcohol use No   • Drug use: Yes     Special: Methamphetamines      Comment: unknown- per pt's family report   • Sexual activity: Not Asked     Other Topics Concern   • None     Social History Narrative       Family History   Problem Relation Age of Onset   • No Known Problems Father    • No Known Problems Mother        Objective    Visit Vitals   • /92   • Temp 97.6 °F (36.4 °C)   • Ht 69\" (175.3 cm)   • Wt 196 lb 3.2 oz (89 kg)   • BMI 28.97 kg/m2       Physical Exam    Admission on 02/04/2017, Discharged on 02/07/2017   Component Date Value Ref Range Status   • WBC 02/05/2017 4.12* 4.80 - 10.80 10*3/mm3 Final   • RBC 02/05/2017 3.97* 4.80 - 5.90 10*6/mm3 Final   • Hemoglobin 02/05/2017 11.7* 14.0 - 18.0 g/dL Final   • Hematocrit 02/05/2017 35.0* 40.0 - 52.0 % Final   • MCV 02/05/2017 88.2  82.0 - 95.0 fL Final   • MCH 02/05/2017 29.5  28.0 - 32.0 pg Final   • MCHC 02/05/2017 33.4  33.0 - 36.0 g/dL Final   • RDW " 02/05/2017 13.9  12.0 - 15.0 % Final   • RDW-SD 02/05/2017 44.7  40.0 - 54.0 fl Final   • MPV 02/05/2017 9.9  6.0 - 12.0 fL Final   • Platelets 02/05/2017 189  130 - 400 10*3/mm3 Final   • Glucose 02/05/2017 96  70 - 100 mg/dL Final   • BUN 02/05/2017 16  5 - 21 mg/dL Final   • Creatinine 02/05/2017 0.79  0.50 - 1.40 mg/dL Final   • Sodium 02/05/2017 137  135 - 145 mmol/L Final   • Potassium 02/05/2017 3.7  3.5 - 5.3 mmol/L Final   • Chloride 02/05/2017 106  98 - 110 mmol/L Final   • CO2 02/05/2017 26.0  24.0 - 31.0 mmol/L Final   • Calcium 02/05/2017 8.1* 8.4 - 10.4 mg/dL Final   • eGFR Non African Amer 02/05/2017 98  >60 mL/min/1.73 Final   • BUN/Creatinine Ratio 02/05/2017 20.3  7.0 - 25.0 Final   • Anion Gap 02/05/2017 5.0  4.0 - 13.0 mmol/L Final   • Case Report 02/06/2017    Final                    Value:Surgical Pathology Report                         Case: [id number]                                  Authorizing Provider:  Chuck Venegas MD        Collected:           02/06/2017 04:52 PM          Ordering Location:     [facility] OR  Received:            02/07/2017 09:29 AM          Pathologist:           Jaime Gaines MD                                                    Specimens:   1) - Urinary Bladder, Right posterior erythema biopsy                                               2) - Urinary Bladder, Trigon biopsy                                                                 3) - Urinary Bladder, Left dome biopsy                                                              4) - Urinary Bladder, Dome biopsy                                                         • Clinical Information 02/06/2017    Final                    Value:This result contains rich text formatting which cannot be displayed here.   • Final Diagnosis 02/06/2017    Final                    Value:This result contains rich text formatting which cannot be displayed here.   • Gross Description 02/06/2017    Final                     Value:This result contains rich text formatting which cannot be displayed here.   • Microscopic Description 02/06/2017    Final                    Value:This result contains rich text formatting which cannot be displayed here.       Results for orders placed or performed during the hospital encounter of 02/04/17   CBC (No Diff)   Result Value Ref Range    WBC 4.12 (L) 4.80 - 10.80 10*3/mm3    RBC 3.97 (L) 4.80 - 5.90 10*6/mm3    Hemoglobin 11.7 (L) 14.0 - 18.0 g/dL    Hematocrit 35.0 (L) 40.0 - 52.0 %    MCV 88.2 82.0 - 95.0 fL    MCH 29.5 28.0 - 32.0 pg    MCHC 33.4 33.0 - 36.0 g/dL    RDW 13.9 12.0 - 15.0 %    RDW-SD 44.7 40.0 - 54.0 fl    MPV 9.9 6.0 - 12.0 fL    Platelets 189 130 - 400 10*3/mm3   Basic Metabolic Panel   Result Value Ref Range    Glucose 96 70 - 100 mg/dL    BUN 16 5 - 21 mg/dL    Creatinine 0.79 0.50 - 1.40 mg/dL    Sodium 137 135 - 145 mmol/L    Potassium 3.7 3.5 - 5.3 mmol/L    Chloride 106 98 - 110 mmol/L    CO2 26.0 24.0 - 31.0 mmol/L    Calcium 8.1 (L) 8.4 - 10.4 mg/dL    eGFR Non African Amer 98 >60 mL/min/1.73    BUN/Creatinine Ratio 20.3 7.0 - 25.0    Anion Gap 5.0 4.0 - 13.0 mmol/L   Tissue Exam   Result Value Ref Range    Case Report       Surgical Pathology Report                         Case: [id number]                                  Authorizing Provider:  Chuck Venegas MD        Collected:           02/06/2017 04:52 PM          Ordering Location:     [facility] OR  Received:            02/07/2017 09:29 AM          Pathologist:           Jaime Gaines MD                                                    Specimens:   1) - Urinary Bladder, Right posterior erythema biopsy                                               2) - Urinary Bladder, Trigon biopsy                                                                 3) - Urinary Bladder, Left dome biopsy                                                              4) - Urinary Bladder, Dome  "biopsy                                                          Clinical Information       Pre-Op Diagnosis:      Erythema.        Final Diagnosis       1.     Urinary bladder, right posterior wall, biopsy of erythema:               Benign cystitis cystica glandularis.                 Marked chronic cystitis.                 No evidence of malignancy.      2.     Urinary bladder, trigone, biopsy:               Chronic cystitis.                  No dysplastic or malignant changes.    3.     Urinary bladder, trigone, biopsy:              Chronic cystitis.                 No dysplastic or malignant changes.      4.     Urinary bladder, dome, biopsy:                   Chronic cystitis.                     No dysplastic or malignant changes.                                         1       Gross Description       Specimen #1 is received in formalin, labeled with the patient's name, medical record number and \"right posterior erythema biopsy.\" The specimen consists of two fragments of mucosa-covered soft tissue measuring 0.2 cm in greatest dimension and 0.7 x 0.4 x 0.2 cm.  The mucosal surfaces are slightly raised with erythematous changes.  The resection margin on the largest fragment is inked blue.  The largest fragment is trisected and the entire specimen is submitted in (block 1A).      Specimen #2 is received in formalin, labeled with the patient's name, medical record number and \"trigone.\"  The specimen consists of three yellow-pink soft tissue fragments aggregating to 0.3 x 0.2 x 0.1 cm. The fragments have pink-gray mucosal surfaces with focal erythematous changes.  The fragments are wrapped in lens paper and are totally submitted in (block 2A).    Specimen #3 is received in formalin, labeled with the patient's name, medical record number and \"left dome of bladder.\" The specimen consists of two yellow-pink soft tissue fragments aggregating to 0.2 x 0.1 x less than 0.1 cm. The mucosal surfaces appear slightly " "erythematous.  The fragments are wrapped in lens paper and are totally submitted in (block 3A).     Specimen #4 is received in formalin, labeled with the patient's name, medical record number and \"bladder dome.\" The specimen consists of a Telfa pad with one tan-pink mucosa-covered tissue fragment measuring 0.3 x 0.2 x 0.2 cm. The mucosal surface has an area of focal erythema measuring 0.1 cm in greatest dimension. The entire specimen is submitted in (block 4A).   JBT/pat              Microscopic Description       1.     Sections of the biopsy of an area of erythema of the right posterior bladder wall reveal chronic inflammation of the urothelial associated cystitis cystica glandularis.  There are no atypical features or malignant changes in the surface urothelium.  There is no evidence of a papillary neoplasm.  There is vascular congestion of the urothelium associated with the chronic inflammation.      2.     Sections of the biopsy of trigone of urinary bladder reveal acute and chronic inflammation.  There is an infiltrate of lymphocytes, neutrophils and eosinophils as well as plasma cells.  The urothelium shows no atypical or malignant features.  In contrast to the biopsy described above, there is no evidence of cystitis cystica glandularis.  There is no evidence of malignancy.        3.     Sections of the biopsy from dome and urinary bladder reveal benign urothelium.  There is mild chronic inflammation present.  There are no dysplastic or malignant changes.       4.     Sections of the biopsy from dome of urinary bladder reveal similar findings with the presence of chronic cystitis.  There is an inflammatory infiltrate including lymphocytes and eosinophils and neutrophils.  There are no dysplastic or malignant changes.           RHM/ltw       Embedded Images       Assessment and Plan    Duarte was seen today for gross hematuria.    Diagnoses and all orders for this visit:    Gross hematuria   patient is here for " follow-up for gross hematuria.  He was taken to the operating room by Dr. Venegas last weekend for clot evacuation and multiple random biopsies.  This did show chronic cystitis.  No malignancy.  We discussed possible causes of recurrent gross hematuria.  This often includes repeated infection, radiation cystitis, chemotherapy exposure, or drug exposure specifically methamphetamine.  He has not had any infections and has no radiation or chemotherapy exposure.  I requested a urine drug screen on him today and he became somewhat agitated and refused.  I am concerned that this may be involved and would like to rule this out but again he has refused.  Today I removed the catheter.  He will follow up with me on an as-needed basis.    EMR Dragon/Transcription disclaimer:  Much of this encounter note is an electronic transcription/translation of spoken language to printed text. The electronic translation of spoken language may permit erroneous, or at times, nonsensical words or phrases to be inadvertently transcribed; although I have reviewed the note for such errors, some may still exist.

## 2021-07-20 ENCOUNTER — APPOINTMENT (OUTPATIENT)
Dept: GENERAL RADIOLOGY | Age: 71
DRG: 918 | End: 2021-07-20
Payer: MEDICARE

## 2021-07-20 ENCOUNTER — HOSPITAL ENCOUNTER (INPATIENT)
Age: 71
LOS: 2 days | Discharge: PSYCHIATRIC OR SUBSTANCE USE FACILITY | DRG: 918 | End: 2021-07-22
Attending: EMERGENCY MEDICINE
Payer: MEDICARE

## 2021-07-20 DIAGNOSIS — T14.91XA SUICIDE ATTEMPT (HCC): ICD-10-CM

## 2021-07-20 DIAGNOSIS — T52.8X2A ETHYLENE GLYCOL POISONING, INTENTIONAL SELF-HARM, INITIAL ENCOUNTER (HCC): Primary | ICD-10-CM

## 2021-07-20 PROBLEM — T50.902A DRUG OVERDOSE, INTENTIONAL, INITIAL ENCOUNTER (HCC): Status: ACTIVE | Noted: 2021-07-20

## 2021-07-20 LAB
ACETAMINOPHEN LEVEL: <15 UG/ML
ALBUMIN SERPL-MCNC: 3.5 G/DL (ref 3.5–5.2)
ALP BLD-CCNC: 86 U/L (ref 40–130)
ALT SERPL-CCNC: 20 U/L (ref 5–41)
AMPHETAMINE SCREEN, URINE: POSITIVE
ANION GAP SERPL CALCULATED.3IONS-SCNC: 11 MMOL/L (ref 7–19)
APTT: 27.9 SEC (ref 26–36.2)
AST SERPL-CCNC: 22 U/L (ref 5–40)
BACTERIA: ABNORMAL /HPF
BARBITURATE SCREEN URINE: NEGATIVE
BASE EXCESS VENOUS: 2 MMOL/L
BASOPHILS ABSOLUTE: 0 K/UL (ref 0–0.2)
BASOPHILS RELATIVE PERCENT: 0.7 % (ref 0–1)
BENZODIAZEPINE SCREEN, URINE: NEGATIVE
BILIRUB SERPL-MCNC: 0.5 MG/DL (ref 0.2–1.2)
BILIRUBIN URINE: NEGATIVE
BLOOD, URINE: ABNORMAL
BUN BLDV-MCNC: 26 MG/DL (ref 8–23)
CALCIUM SERPL-MCNC: 9.1 MG/DL (ref 8.8–10.2)
CANNABINOID SCREEN URINE: NEGATIVE
CARBOXYHEMOGLOBIN: 3.1 %
CHLORIDE BLD-SCNC: 105 MMOL/L (ref 98–111)
CLARITY: ABNORMAL
CO2: 22 MMOL/L (ref 22–29)
COCAINE METABOLITE SCREEN URINE: NEGATIVE
COLOR: YELLOW
CREAT SERPL-MCNC: 0.9 MG/DL (ref 0.5–1.2)
CRYSTALS, UA: ABNORMAL /HPF
EOSINOPHILS ABSOLUTE: 0.1 K/UL (ref 0–0.6)
EOSINOPHILS RELATIVE PERCENT: 2 % (ref 0–5)
ETHANOL: <10 MG/DL (ref 0–0.08)
GFR AFRICAN AMERICAN: >59
GFR NON-AFRICAN AMERICAN: >60
GLUCOSE BLD-MCNC: 99 MG/DL (ref 74–109)
GLUCOSE URINE: NEGATIVE MG/DL
HCO3 VENOUS: 27 MMOL/L (ref 23–29)
HCT VFR BLD CALC: 35.5 % (ref 42–52)
HEMOGLOBIN: 11.9 G/DL (ref 14–18)
IMMATURE GRANULOCYTES #: 0 K/UL
INR BLD: 1.11 (ref 0.88–1.18)
KETONES, URINE: NEGATIVE MG/DL
LACTIC ACID: 2.6 MMOL/L (ref 0.5–1.9)
LEUKOCYTE ESTERASE, URINE: ABNORMAL
LYMPHOCYTES ABSOLUTE: 1 K/UL (ref 1.1–4.5)
LYMPHOCYTES RELATIVE PERCENT: 21.8 % (ref 20–40)
Lab: ABNORMAL
MCH RBC QN AUTO: 30.5 PG (ref 27–31)
MCHC RBC AUTO-ENTMCNC: 33.5 G/DL (ref 33–37)
MCV RBC AUTO: 91 FL (ref 80–94)
MONOCYTES ABSOLUTE: 0.6 K/UL (ref 0–0.9)
MONOCYTES RELATIVE PERCENT: 13.3 % (ref 0–10)
NEUTROPHILS ABSOLUTE: 2.8 K/UL (ref 1.5–7.5)
NEUTROPHILS RELATIVE PERCENT: 62 % (ref 50–65)
NITRITE, URINE: NEGATIVE
O2 CONTENT, VEN: 10 ML/DL
O2 SAT, VEN: 57 %
OPIATE SCREEN URINE: NEGATIVE
OSMOLALITY: 295 MOSM/KG (ref 275–300)
PCO2, VEN: 46 MMHG (ref 40–50)
PDW BLD-RTO: 13.5 % (ref 11.5–14.5)
PH UA: 6 (ref 5–8)
PH VENOUS: 7.38 (ref 7.35–7.45)
PLATELET # BLD: 187 K/UL (ref 130–400)
PMV BLD AUTO: 9.7 FL (ref 9.4–12.4)
PO2, VEN: 24 MMHG
POTASSIUM REFLEX MAGNESIUM: 3.7 MMOL/L (ref 3.5–5)
PROTEIN UA: ABNORMAL MG/DL
PROTHROMBIN TIME: 14.5 SEC (ref 12–14.6)
RBC # BLD: 3.9 M/UL (ref 4.7–6.1)
RBC UA: ABNORMAL /HPF (ref 0–2)
SALICYLATE, SERUM: <3 MG/DL (ref 3–10)
SARS-COV-2, NAAT: NOT DETECTED
SODIUM BLD-SCNC: 138 MMOL/L (ref 136–145)
SPECIFIC GRAVITY UA: 1.02 (ref 1–1.03)
TOTAL PROTEIN: 6.7 G/DL (ref 6.6–8.7)
TROPONIN: <0.01 NG/ML (ref 0–0.03)
UROBILINOGEN, URINE: 0.2 E.U./DL
WBC # BLD: 4.5 K/UL (ref 4.8–10.8)
WBC UA: ABNORMAL /HPF (ref 0–5)

## 2021-07-20 PROCEDURE — 96375 TX/PRO/DX INJ NEW DRUG ADDON: CPT

## 2021-07-20 PROCEDURE — 85610 PROTHROMBIN TIME: CPT

## 2021-07-20 PROCEDURE — 80179 DRUG ASSAY SALICYLATE: CPT

## 2021-07-20 PROCEDURE — 6360000002 HC RX W HCPCS: Performed by: EMERGENCY MEDICINE

## 2021-07-20 PROCEDURE — 80307 DRUG TEST PRSMV CHEM ANLYZR: CPT

## 2021-07-20 PROCEDURE — 80143 DRUG ASSAY ACETAMINOPHEN: CPT

## 2021-07-20 PROCEDURE — 80053 COMPREHEN METABOLIC PANEL: CPT

## 2021-07-20 PROCEDURE — 82693 ASSAY OF ETHYLENE GLYCOL: CPT

## 2021-07-20 PROCEDURE — 1210000000 HC MED SURG R&B

## 2021-07-20 PROCEDURE — 82800 BLOOD PH: CPT

## 2021-07-20 PROCEDURE — 2500000003 HC RX 250 WO HCPCS: Performed by: INTERNAL MEDICINE

## 2021-07-20 PROCEDURE — 87635 SARS-COV-2 COVID-19 AMP PRB: CPT

## 2021-07-20 PROCEDURE — 85730 THROMBOPLASTIN TIME PARTIAL: CPT

## 2021-07-20 PROCEDURE — 83930 ASSAY OF BLOOD OSMOLALITY: CPT

## 2021-07-20 PROCEDURE — 2580000003 HC RX 258: Performed by: EMERGENCY MEDICINE

## 2021-07-20 PROCEDURE — 2580000003 HC RX 258: Performed by: INTERNAL MEDICINE

## 2021-07-20 PROCEDURE — 93005 ELECTROCARDIOGRAM TRACING: CPT | Performed by: EMERGENCY MEDICINE

## 2021-07-20 PROCEDURE — 81001 URINALYSIS AUTO W/SCOPE: CPT

## 2021-07-20 PROCEDURE — 82803 BLOOD GASES ANY COMBINATION: CPT

## 2021-07-20 PROCEDURE — 2500000003 HC RX 250 WO HCPCS: Performed by: EMERGENCY MEDICINE

## 2021-07-20 PROCEDURE — 99284 EMERGENCY DEPT VISIT MOD MDM: CPT

## 2021-07-20 PROCEDURE — 84484 ASSAY OF TROPONIN QUANT: CPT

## 2021-07-20 PROCEDURE — 96365 THER/PROPH/DIAG IV INF INIT: CPT

## 2021-07-20 PROCEDURE — 83605 ASSAY OF LACTIC ACID: CPT

## 2021-07-20 PROCEDURE — 85025 COMPLETE CBC W/AUTO DIFF WBC: CPT

## 2021-07-20 PROCEDURE — 36415 COLL VENOUS BLD VENIPUNCTURE: CPT

## 2021-07-20 PROCEDURE — 82077 ASSAY SPEC XCP UR&BREATH IA: CPT

## 2021-07-20 PROCEDURE — 96367 TX/PROPH/DG ADDL SEQ IV INF: CPT

## 2021-07-20 PROCEDURE — 71045 X-RAY EXAM CHEST 1 VIEW: CPT

## 2021-07-20 RX ORDER — TAMSULOSIN HYDROCHLORIDE 0.4 MG/1
1 CAPSULE ORAL NIGHTLY
COMMUNITY
End: 2021-07-20

## 2021-07-20 RX ORDER — POLYETHYLENE GLYCOL 3350 17 G/17G
17 POWDER, FOR SOLUTION ORAL DAILY PRN
Status: DISCONTINUED | OUTPATIENT
Start: 2021-07-20 | End: 2021-07-22 | Stop reason: HOSPADM

## 2021-07-20 RX ORDER — SODIUM CHLORIDE 0.9 % (FLUSH) 0.9 %
5-40 SYRINGE (ML) INJECTION PRN
Status: DISCONTINUED | OUTPATIENT
Start: 2021-07-20 | End: 2021-07-22 | Stop reason: HOSPADM

## 2021-07-20 RX ORDER — ACETAMINOPHEN 325 MG/1
650 TABLET ORAL EVERY 6 HOURS PRN
Status: DISCONTINUED | OUTPATIENT
Start: 2021-07-20 | End: 2021-07-22 | Stop reason: HOSPADM

## 2021-07-20 RX ORDER — SODIUM CHLORIDE 0.9 % (FLUSH) 0.9 %
5-40 SYRINGE (ML) INJECTION EVERY 12 HOURS SCHEDULED
Status: DISCONTINUED | OUTPATIENT
Start: 2021-07-20 | End: 2021-07-22 | Stop reason: HOSPADM

## 2021-07-20 RX ORDER — ACETAMINOPHEN 650 MG/1
650 SUPPOSITORY RECTAL EVERY 6 HOURS PRN
Status: DISCONTINUED | OUTPATIENT
Start: 2021-07-20 | End: 2021-07-22 | Stop reason: HOSPADM

## 2021-07-20 RX ORDER — NICOTINE 21 MG/24HR
1 PATCH, TRANSDERMAL 24 HOURS TRANSDERMAL DAILY
Status: DISCONTINUED | OUTPATIENT
Start: 2021-07-21 | End: 2021-07-22 | Stop reason: HOSPADM

## 2021-07-20 RX ORDER — SODIUM CHLORIDE 9 MG/ML
25 INJECTION, SOLUTION INTRAVENOUS PRN
Status: DISCONTINUED | OUTPATIENT
Start: 2021-07-20 | End: 2021-07-22 | Stop reason: HOSPADM

## 2021-07-20 RX ORDER — THIAMINE HYDROCHLORIDE 100 MG/ML
100 INJECTION, SOLUTION INTRAMUSCULAR; INTRAVENOUS DAILY
Status: DISCONTINUED | OUTPATIENT
Start: 2021-07-20 | End: 2021-07-22 | Stop reason: HOSPADM

## 2021-07-20 RX ORDER — SODIUM CHLORIDE 9 MG/ML
INJECTION, SOLUTION INTRAVENOUS CONTINUOUS
Status: DISCONTINUED | OUTPATIENT
Start: 2021-07-20 | End: 2021-07-22 | Stop reason: HOSPADM

## 2021-07-20 RX ORDER — ONDANSETRON 2 MG/ML
4 INJECTION INTRAMUSCULAR; INTRAVENOUS EVERY 6 HOURS PRN
Status: DISCONTINUED | OUTPATIENT
Start: 2021-07-20 | End: 2021-07-22 | Stop reason: HOSPADM

## 2021-07-20 RX ORDER — MAGNESIUM SULFATE IN WATER 40 MG/ML
2000 INJECTION, SOLUTION INTRAVENOUS ONCE
Status: COMPLETED | OUTPATIENT
Start: 2021-07-20 | End: 2021-07-20

## 2021-07-20 RX ADMIN — THIAMINE HYDROCHLORIDE 100 MG: 100 INJECTION, SOLUTION INTRAMUSCULAR; INTRAVENOUS at 18:36

## 2021-07-20 RX ADMIN — FAMOTIDINE 20 MG: 10 INJECTION, SOLUTION INTRAVENOUS at 23:49

## 2021-07-20 RX ADMIN — MAGNESIUM SULFATE HEPTAHYDRATE 2000 MG: 40 INJECTION, SOLUTION INTRAVENOUS at 19:33

## 2021-07-20 RX ADMIN — SODIUM CHLORIDE: 9 INJECTION, SOLUTION INTRAVENOUS at 21:44

## 2021-07-20 RX ADMIN — SODIUM BICARBONATE 50 MEQ: 84 INJECTION, SOLUTION INTRAVENOUS at 18:33

## 2021-07-20 RX ADMIN — PYRIDOXINE HYDROCHLORIDE 100 MG: 100 INJECTION, SOLUTION INTRAMUSCULAR; INTRAVENOUS at 19:33

## 2021-07-20 RX ADMIN — FOMEPIZOLE 1220 MG: 1 INJECTION, SOLUTION INTRAVENOUS at 18:48

## 2021-07-20 ASSESSMENT — ENCOUNTER SYMPTOMS
VOICE CHANGE: 0
DIARRHEA: 0
VOMITING: 0
ABDOMINAL PAIN: 0
SHORTNESS OF BREATH: 0
RHINORRHEA: 0
COUGH: 0
EYE PAIN: 0
EYE REDNESS: 0

## 2021-07-20 NOTE — ED NOTES
Placed call to poison center, spoke with [facility] RN  Suggests thiamine, pyrodoxine, fomephizole, ABG and other labs.         Diana Harrison RN  07/20/21 8751

## 2021-07-20 NOTE — ED PROVIDER NOTES
[facility] ICU  EMERGENCY DEPARTMENT ENCOUNTER      Pt Name: Yoan Reno  MRN: [id number]  [place] 1950  Date of evaluation: 7/20/2021  Provider: Karey Mauricio MD    CHIEF COMPLAINT       Chief Complaint   Patient presents with    Drug Overdose     drank 32 oz of antifreeze         HISTORY OF PRESENT ILLNESS   (Location/Symptom, Timing/Onset,Context/Setting, Quality, Duration, Modifying Factors, Severity)  Note limiting factors. Yoan Reno is a 72 y.o. male who presents to the emergency department for evaluation after intentionally drinking a quantity of antifreeze. Patient does confirm this was and a suicide attempt and mentions several recent life stressors involving family members. EMS notes it was believed patient consumed approximately 36 fluid ounces of antifreeze. Patient states he does not believe he drank quite that much but states he drank 2 glasses of it. States he has a history of chronic kidney problems but denies any other medical issues. States he has numbness and tingling in both hands and started to have some blurred vision in route here with EMS. The history is provided by the patient and the EMS personnel. NursingNotes were reviewed. REVIEW OF SYSTEMS    (2-9 systems for level 4, 10 or more for level 5)     Review of Systems   Constitutional: Negative for fatigue and fever. HENT: Negative for congestion, rhinorrhea and voice change. Eyes: Positive for visual disturbance. Negative for pain and redness. Respiratory: Negative for cough and shortness of breath. Cardiovascular: Negative for chest pain. Gastrointestinal: Negative for abdominal pain, diarrhea and vomiting. Endocrine: Negative. Genitourinary: Negative. Musculoskeletal: Negative for arthralgias and gait problem. Skin: Negative for rash and wound. Neurological: Positive for numbness. Negative for weakness and headaches. Hematological: Negative. Psychiatric/Behavioral: Negative. All other systems reviewed and are negative. A complete review of systems was performed and is negative except as noted above in the HPI. PAST MEDICAL HISTORY     Past Medical History:   Diagnosis Date    Parkinson disease [facility])          SURGICAL HISTORY       Past Surgical History:   Procedure Laterality Date    BLADDER SURGERY      TURB         CURRENT MEDICATIONS       Current Discharge Medication List          ALLERGIES     Patient has no known allergies. FAMILY HISTORY     History reviewed. No pertinent family history. SOCIAL HISTORY       Social History     Socioeconomic History    Marital status:      Spouse name: None    Number of children: None    Years of education: None    Highest education level: None   Occupational History    None   Tobacco Use    Smoking status: Current Every Day Smoker     Types: Cigarettes    Smokeless tobacco: Never Used   Vaping Use    Vaping Use: Never assessed   Substance and Sexual Activity    Alcohol use: Never    Drug use: Never    Sexual activity: None   Other Topics Concern    None   Social History Narrative    None     Social Determinants of Health     Financial Resource Strain:     Difficulty of Paying Living Expenses:    Food Insecurity:     Worried About Running Out of Food in the Last Year:     Ran Out of Food in the Last Year:    Transportation Needs:     Lack of Transportation (Medical):      Lack of Transportation (Non-Medical):    Physical Activity:     Days of Exercise per Week:     Minutes of Exercise per Session:    Stress:     Feeling of Stress :    Social Connections:     Frequency of Communication with Friends and Family:     Frequency of Social Gatherings with Friends and Family:     Attends [employer] Services:     Active Member of Clubs or Organizations:     Attends Club or Organization Meetings:     Marital Status:    Intimate Partner Violence:     Fear of Current or Ex-Partner:     Emotionally Abused:     Physically Abused:     Sexually Abused:        SCREENINGS    [place] Coma Scale  Eye Opening: Spontaneous  Best Verbal Response: Oriented  Best Motor Response: Obeys commands  [place] Coma Scale Score: 15        PHYSICAL EXAM    (up to 7 for level 4, 8 or more for level 5)     ED Triage Vitals   BP Temp Temp Source Pulse Resp SpO2 Height Weight   07/20/21 1820 07/20/21 1816 07/20/21 1816 07/20/21 1816 07/20/21 1816 07/20/21 1816 -- 07/20/21 1816   136/83 98.2 °F (36.8 °C) Oral 70 16 91 %  180 lb (81.6 kg)       Physical Exam  Vitals and nursing note reviewed. Constitutional:       General: He is not in acute distress. Appearance: He is well-developed. He is not toxic-appearing or diaphoretic. HENT:      Head: Normocephalic and atraumatic. Eyes:      General: No scleral icterus. Right eye: No discharge. Left eye: No discharge. Pupils: Pupils are equal, round, and reactive to light. Cardiovascular:      Rate and Rhythm: Normal rate and regular rhythm. Heart sounds: Normal heart sounds. Pulmonary:      Effort: Pulmonary effort is normal. No respiratory distress. Breath sounds: No stridor. No wheezing or rhonchi. Abdominal:      General: There is no distension. Palpations: Abdomen is soft. Tenderness: There is no abdominal tenderness. Musculoskeletal:         General: No deformity. Normal range of motion. Cervical back: Normal range of motion. Skin:     General: Skin is warm and dry. Neurological:      Mental Status: He is alert and oriented to person, place, and time. GCS: GCS eye subscore is 4. GCS verbal subscore is 5. GCS motor subscore is 6. Cranial Nerves: No cranial nerve deficit, dysarthria or facial asymmetry. Sensory: No sensory deficit. Motor: No weakness or abnormal muscle tone. Coordination: Romberg sign negative.  Coordination normal.      Gait: Gait normal.   Psychiatric:         Mood and Affect: Mood normal.         Speech: Speech is slurred. Behavior: Behavior normal.         Thought Content: Thought content normal.         Judgment: Judgment normal.         DIAGNOSTIC RESULTS     EKG: All EKG's are interpreted by the Emergency Department Physician who either signs or Co-signs this chart in the absence of a cardiologist.      RADIOLOGY:   Non-plain film images such as CT, Ultrasound and MRI are read by the radiologist. Zollie Buckle images are visualized and preliminarily interpreted by the emergency physician with the below findings:        Interpretation per the Radiologist below, if available at the time of this note:    XR CHEST PORTABLE   Final Result   No acute findings.    Signed by Dr Torre Poster            ED BEDSIDE ULTRASOUND:   Performed by ED Physician - none    LABS:  Labs Reviewed   CBC WITH AUTO DIFFERENTIAL - Abnormal; Notable for the following components:       Result Value    WBC 4.5 (*)     RBC 3.90 (*)     Hemoglobin 11.9 (*)     Hematocrit 35.5 (*)     Monocytes % 13.3 (*)     Lymphocytes Absolute 1.0 (*)     All other components within normal limits   COMPREHENSIVE METABOLIC PANEL W/ REFLEX TO MG FOR LOW K - Abnormal; Notable for the following components:    BUN 26 (*)     All other components within normal limits   LACTIC ACID, PLASMA - Abnormal; Notable for the following components:    Lactic Acid 2.6 (*)     All other components within normal limits    Narrative:     CALL  Santiago  KLED tel. ,  Chemistry results called to and read back by Brice Rodríguez in ED, 07/20/2021  19:09, by Susan LEARY-19, RAPID   OSMOLALITY, SERUM   PROTIME-INR   APTT   ACETAMINOPHEN LEVEL   SALICYLATE LEVEL   ETHANOL   TROPONIN   VENOUS BLD GAS   URINE RT REFLEX TO CULTURE   BODY FLUID CRYSTAL   BLOOD GAS, VENOUS   URINE DRUG SCREEN   ETHYLENE GLYCOL   COMPREHENSIVE METABOLIC PANEL W/ REFLEX TO MG FOR LOW K   COMPREHENSIVE METABOLIC PANEL W/ REFLEX TO MG FOR LOW K   LACTIC ACID, PLASMA   LACTIC ACID, PLASMA   CBC   CBC   PROTIME-INR   COMPREHENSIVE METABOLIC PANEL W/ REFLEX TO MG FOR LOW K   LACTIC ACID, PLASMA   CBC       All other labs were within normal range or not returned as of this dictation.     Medications   pyridoxine (B-6) injection 100 mg (100 mg Intravenous Given 7/20/21 1933)   thiamine (B-1) injection 100 mg (100 mg Intravenous Given 7/20/21 1836)   0.9 % sodium chloride infusion ( Intravenous New Bag 7/20/21 2144)   sodium chloride flush 0.9 % injection 5-40 mL (has no administration in time range)   sodium chloride flush 0.9 % injection 5-40 mL (has no administration in time range)   0.9 % sodium chloride infusion (has no administration in time range)   enoxaparin (LOVENOX) injection 40 mg (has no administration in time range)   polyethylene glycol (GLYCOLAX) packet 17 g (has no administration in time range)   acetaminophen (TYLENOL) tablet 650 mg (has no administration in time range)     Or   acetaminophen (TYLENOL) suppository 650 mg (has no administration in time range)   ondansetron (ZOFRAN) injection 4 mg (has no administration in time range)   famotidine (PEPCID) injection 20 mg (has no administration in time range)   nicotine (NICODERM CQ) 21 MG/24HR 1 patch (has no administration in time range)   fomepizole (ANTIZOL) 1,220 mg in sodium chloride 0.9 % 100 mL IVPB (0 mg/kg × 81.6 kg Intravenous Stopped 7/20/21 1918)   sodium bicarbonate 8.4 % injection 50 mEq (50 mEq Intravenous Given 7/20/21 1833)   magnesium sulfate 2000 mg in 50 mL IVPB premix (2,000 mg Intravenous New Bag 7/20/21 1933)       EMERGENCY DEPARTMENT COURSE and DIFFERENTIALDIAGNOSIS/MDM:   Vitals:    Vitals:    07/20/21 1816 07/20/21 1820 07/20/21 2100   BP:  136/83 (!) 150/67   Pulse: 70  61   Resp: 16  17   Temp: 98.2 °F (36.8 °C)     TempSrc: Oral     SpO2: 91%  94%   Weight: 180 lb (81.6 kg)  194 lb 1.6 oz (88 kg)   Height:   5' 9\" (1.753 m)       MDM  Number of Diagnoses or Management Options  Ethylene glycol poisoning, intentional self-harm, initial encounter [facility]): new and requires workup  Suicide attempt [facility]): new and requires workup     Amount and/or Complexity of Data Reviewed  Clinical lab tests: ordered and reviewed  Tests in the radiology section of CPT®: ordered and reviewed  Tests in the medicine section of CPT®: ordered and reviewed  Obtain history from someone other than the patient: yes  Review and summarize past medical records: yes  Discuss the patient with other providers: yes  Independent visualization of images, tracings, or specimens: yes    Risk of Complications, Morbidity, and/or Mortality  Presenting problems: high  Diagnostic procedures: moderate  Management options: high    Critical Care  Total time providing critical care: 30-74 minutes    Patient Progress  Patient progress: stable      ED Course as of Jul 20 2204   Tue Jul 20, 2021   [address] [place] [address] Patient cooperative at this time but 72-hour involuntary hold was signed given patient's suicide attempt and possibility he may try to leave    [DENISE]   1900 EKG shows sinus rhythm with a rate of 69. No findings of acute ischemia or infarction. Normal intervals. No evidence of drug toxicity. [DENISE]   1909 Lactic Acid(!!): 2.6 [DENISE]   1913 Anion Gap: 11 [DENISE]   1930 Discussed case with nephrology on-call. At this time patient is without acidosis, elevated anion gap, significantly altered mental status, or other strong indications for dialysis. Feel the medical management is reasonable with continued monitoring of labs and mental status. [DENISE]      ED Course User Index  [DENISE] Rajwinder Goodwin MD     Anion gap and osmolar gap both within normal limits. Patient without acidosis. No evidence of congestion. Based on the evaluation and work-up here patient is felt to require further monitoring, work-up, or treatment that is available in the emergency department. Case was discussed with hospitalist who agrees for observation or admission for further management.

## 2021-07-20 NOTE — ED NOTES
Bed: 02-A  Expected date:   Expected time:   Means of arrival:   Comments:  Air evac     Real Bailey RN  07/20/21 0360

## 2021-07-21 LAB
ALBUMIN SERPL-MCNC: 3.2 G/DL (ref 3.5–5.2)
ALBUMIN SERPL-MCNC: 3.5 G/DL (ref 3.5–5.2)
ALBUMIN SERPL-MCNC: 3.7 G/DL (ref 3.5–5.2)
ALBUMIN SERPL-MCNC: 3.7 G/DL (ref 3.5–5.2)
ALP BLD-CCNC: 81 U/L (ref 40–130)
ALP BLD-CCNC: 82 U/L (ref 40–130)
ALP BLD-CCNC: 91 U/L (ref 40–130)
ALP BLD-CCNC: 92 U/L (ref 40–130)
ALT SERPL-CCNC: 18 U/L (ref 5–41)
ALT SERPL-CCNC: 20 U/L (ref 5–41)
ALT SERPL-CCNC: 21 U/L (ref 5–41)
ALT SERPL-CCNC: 21 U/L (ref 5–41)
ANION GAP SERPL CALCULATED.3IONS-SCNC: 10 MMOL/L (ref 7–19)
ANION GAP SERPL CALCULATED.3IONS-SCNC: 11 MMOL/L (ref 7–19)
ANION GAP SERPL CALCULATED.3IONS-SCNC: 8 MMOL/L (ref 7–19)
ANION GAP SERPL CALCULATED.3IONS-SCNC: 9 MMOL/L (ref 7–19)
AST SERPL-CCNC: 17 U/L (ref 5–40)
AST SERPL-CCNC: 19 U/L (ref 5–40)
AST SERPL-CCNC: 19 U/L (ref 5–40)
AST SERPL-CCNC: 20 U/L (ref 5–40)
BILIRUB SERPL-MCNC: 0.4 MG/DL (ref 0.2–1.2)
BILIRUB SERPL-MCNC: 0.5 MG/DL (ref 0.2–1.2)
BUN BLDV-MCNC: 20 MG/DL (ref 8–23)
BUN BLDV-MCNC: 20 MG/DL (ref 8–23)
BUN BLDV-MCNC: 21 MG/DL (ref 8–23)
BUN BLDV-MCNC: 24 MG/DL (ref 8–23)
CALCIUM SERPL-MCNC: 8.3 MG/DL (ref 8.8–10.2)
CALCIUM SERPL-MCNC: 8.7 MG/DL (ref 8.8–10.2)
CALCIUM SERPL-MCNC: 8.8 MG/DL (ref 8.8–10.2)
CALCIUM SERPL-MCNC: 8.9 MG/DL (ref 8.8–10.2)
CHLORIDE BLD-SCNC: 103 MMOL/L (ref 98–111)
CHLORIDE BLD-SCNC: 104 MMOL/L (ref 98–111)
CHLORIDE BLD-SCNC: 105 MMOL/L (ref 98–111)
CHLORIDE BLD-SCNC: 105 MMOL/L (ref 98–111)
CO2: 24 MMOL/L (ref 22–29)
CO2: 26 MMOL/L (ref 22–29)
CO2: 26 MMOL/L (ref 22–29)
CO2: 28 MMOL/L (ref 22–29)
CREAT SERPL-MCNC: 0.9 MG/DL (ref 0.5–1.2)
CREAT SERPL-MCNC: 1 MG/DL (ref 0.5–1.2)
EKG P AXIS: 39 DEGREES
EKG P-R INTERVAL: 202 MS
EKG Q-T INTERVAL: 422 MS
EKG QRS DURATION: 104 MS
EKG QTC CALCULATION (BAZETT): 437 MS
EKG T AXIS: 69 DEGREES
GFR AFRICAN AMERICAN: >59
GFR NON-AFRICAN AMERICAN: >60
GLUCOSE BLD-MCNC: 101 MG/DL (ref 74–109)
GLUCOSE BLD-MCNC: 110 MG/DL (ref 74–109)
GLUCOSE BLD-MCNC: 116 MG/DL (ref 74–109)
GLUCOSE BLD-MCNC: 99 MG/DL (ref 74–109)
HCT VFR BLD CALC: 35.5 % (ref 42–52)
HCT VFR BLD CALC: 37.2 % (ref 42–52)
HCT VFR BLD CALC: 38.2 % (ref 42–52)
HCT VFR BLD CALC: 38.4 % (ref 42–52)
HEMOGLOBIN: 11.7 G/DL (ref 14–18)
HEMOGLOBIN: 11.8 G/DL (ref 14–18)
HEMOGLOBIN: 12.4 G/DL (ref 14–18)
HEMOGLOBIN: 12.7 G/DL (ref 14–18)
LACTIC ACID: 1.1 MMOL/L (ref 0.5–1.9)
LACTIC ACID: 1.3 MMOL/L (ref 0.5–1.9)
LACTIC ACID: 1.3 MMOL/L (ref 0.5–1.9)
LACTIC ACID: 1.4 MMOL/L (ref 0.5–1.9)
MCH RBC QN AUTO: 29.8 PG (ref 27–31)
MCH RBC QN AUTO: 29.9 PG (ref 27–31)
MCH RBC QN AUTO: 30 PG (ref 27–31)
MCH RBC QN AUTO: 30.4 PG (ref 27–31)
MCHC RBC AUTO-ENTMCNC: 31.5 G/DL (ref 33–37)
MCHC RBC AUTO-ENTMCNC: 32.5 G/DL (ref 33–37)
MCHC RBC AUTO-ENTMCNC: 33.1 G/DL (ref 33–37)
MCHC RBC AUTO-ENTMCNC: 33.2 G/DL (ref 33–37)
MCV RBC AUTO: 90.6 FL (ref 80–94)
MCV RBC AUTO: 91.5 FL (ref 80–94)
MCV RBC AUTO: 92 FL (ref 80–94)
MCV RBC AUTO: 94.7 FL (ref 80–94)
OSMOLALITY: 292 MOSM/KG (ref 275–300)
OSMOLALITY: 293 MOSM/KG (ref 275–300)
OSMOLALITY: 293 MOSM/KG (ref 275–300)
OSMOLALITY: 295 MOSM/KG (ref 275–300)
PDW BLD-RTO: 13.5 % (ref 11.5–14.5)
PDW BLD-RTO: 13.6 % (ref 11.5–14.5)
PDW BLD-RTO: 13.7 % (ref 11.5–14.5)
PDW BLD-RTO: 13.7 % (ref 11.5–14.5)
PLATELET # BLD: 185 K/UL (ref 130–400)
PLATELET # BLD: 196 K/UL (ref 130–400)
PLATELET # BLD: 205 K/UL (ref 130–400)
PLATELET # BLD: 206 K/UL (ref 130–400)
PMV BLD AUTO: 9.3 FL (ref 9.4–12.4)
PMV BLD AUTO: 9.4 FL (ref 9.4–12.4)
PMV BLD AUTO: 9.6 FL (ref 9.4–12.4)
PMV BLD AUTO: 9.7 FL (ref 9.4–12.4)
POTASSIUM REFLEX MAGNESIUM: 3.9 MMOL/L (ref 3.5–5)
POTASSIUM REFLEX MAGNESIUM: 4.1 MMOL/L (ref 3.5–5)
RBC # BLD: 3.88 M/UL (ref 4.7–6.1)
RBC # BLD: 3.93 M/UL (ref 4.7–6.1)
RBC # BLD: 4.15 M/UL (ref 4.7–6.1)
RBC # BLD: 4.24 M/UL (ref 4.7–6.1)
SODIUM BLD-SCNC: 138 MMOL/L (ref 136–145)
SODIUM BLD-SCNC: 139 MMOL/L (ref 136–145)
SODIUM BLD-SCNC: 140 MMOL/L (ref 136–145)
SODIUM BLD-SCNC: 142 MMOL/L (ref 136–145)
TOTAL PROTEIN: 6.4 G/DL (ref 6.6–8.7)
TOTAL PROTEIN: 6.6 G/DL (ref 6.6–8.7)
TOTAL PROTEIN: 7 G/DL (ref 6.6–8.7)
TOTAL PROTEIN: 7.2 G/DL (ref 6.6–8.7)
WBC # BLD: 4.3 K/UL (ref 4.8–10.8)
WBC # BLD: 4.7 K/UL (ref 4.8–10.8)
WBC # BLD: 4.8 K/UL (ref 4.8–10.8)
WBC # BLD: 5.1 K/UL (ref 4.8–10.8)

## 2021-07-21 PROCEDURE — 80053 COMPREHEN METABOLIC PANEL: CPT

## 2021-07-21 PROCEDURE — 93010 ELECTROCARDIOGRAM REPORT: CPT | Performed by: INTERNAL MEDICINE

## 2021-07-21 PROCEDURE — 36415 COLL VENOUS BLD VENIPUNCTURE: CPT

## 2021-07-21 PROCEDURE — 6360000002 HC RX W HCPCS: Performed by: INTERNAL MEDICINE

## 2021-07-21 PROCEDURE — 2580000003 HC RX 258: Performed by: INTERNAL MEDICINE

## 2021-07-21 PROCEDURE — 1210000000 HC MED SURG R&B

## 2021-07-21 PROCEDURE — 2500000003 HC RX 250 WO HCPCS: Performed by: INTERNAL MEDICINE

## 2021-07-21 PROCEDURE — 83930 ASSAY OF BLOOD OSMOLALITY: CPT

## 2021-07-21 PROCEDURE — 83605 ASSAY OF LACTIC ACID: CPT

## 2021-07-21 PROCEDURE — 85027 COMPLETE CBC AUTOMATED: CPT

## 2021-07-21 RX ORDER — OLANZAPINE 10 MG/1
5 INJECTION, POWDER, LYOPHILIZED, FOR SOLUTION INTRAMUSCULAR
Status: COMPLETED | OUTPATIENT
Start: 2021-07-21 | End: 2021-07-21

## 2021-07-21 RX ORDER — LORAZEPAM 2 MG/ML
1 INJECTION INTRAMUSCULAR
Status: DISCONTINUED | OUTPATIENT
Start: 2021-07-21 | End: 2021-07-22 | Stop reason: HOSPADM

## 2021-07-21 RX ADMIN — SODIUM CHLORIDE: 9 INJECTION, SOLUTION INTRAVENOUS at 07:54

## 2021-07-21 RX ADMIN — FOMEPIZOLE 880 MG: 1 INJECTION, SOLUTION INTRAVENOUS at 06:04

## 2021-07-21 RX ADMIN — SODIUM BICARBONATE 50 MEQ: 84 INJECTION, SOLUTION INTRAVENOUS at 03:36

## 2021-07-21 RX ADMIN — FAMOTIDINE 20 MG: 10 INJECTION, SOLUTION INTRAVENOUS at 09:57

## 2021-07-21 RX ADMIN — THIAMINE HYDROCHLORIDE 100 MG: 100 INJECTION, SOLUTION INTRAMUSCULAR; INTRAVENOUS at 09:57

## 2021-07-21 RX ADMIN — LORAZEPAM 1 MG: 2 INJECTION INTRAMUSCULAR; INTRAVENOUS at 19:39

## 2021-07-21 RX ADMIN — LORAZEPAM 1 MG: 2 INJECTION INTRAMUSCULAR; INTRAVENOUS at 12:06

## 2021-07-21 RX ADMIN — ONDANSETRON 4 MG: 2 INJECTION INTRAMUSCULAR; INTRAVENOUS at 03:37

## 2021-07-21 RX ADMIN — SODIUM CHLORIDE, PRESERVATIVE FREE 10 ML: 5 INJECTION INTRAVENOUS at 09:59

## 2021-07-21 RX ADMIN — OLANZAPINE 5 MG: 10 INJECTION, POWDER, FOR SOLUTION INTRAMUSCULAR at 12:32

## 2021-07-21 ASSESSMENT — ENCOUNTER SYMPTOMS
ABDOMINAL PAIN: 1
SHORTNESS OF BREATH: 1
EYES NEGATIVE: 1
NAUSEA: 1
BACK PAIN: 1
COUGH: 1

## 2021-07-21 NOTE — CONSULTS
Renal Consult Note    Thank you to requesting provider: Dr Alan Avalos, for asking us to see Sandra Jeri    Reason for consultation: Drug overdose    Chief Complaint: Anxiety, depression    History of Presenting Illness      Patient is a 67-year-old pleasant man with past medical history of hematuria and bladder tumors. He stated that he had episode of acute kidney injury. He was brought by family after he was increasingly depressed. Due to some family problems and fights, he felt lonely and wanted to take away his life so he took 32 ounces of antifreeze that he found in his garage. Later his family was where and police was summoned. Patient was also having some nausea and vomiting. He was a bit confused with some altered mental status. He is currently in ICU at [facility]) in [place]. Fomepizole was administered. His labs did not show any significant electrolyte disturbances or any major acidosis or kidney failure. He was receiving IV fluids. Renal service was consulted to help manage the drug overdose. Patient denies any recent dysuria or hematuria.     Past Medical/Surgical History      Active Ambulatory Problems     Diagnosis Date Noted    No Active Ambulatory Problems     Resolved Ambulatory Problems     Diagnosis Date Noted    No Resolved Ambulatory Problems     Past Medical History:   Diagnosis Date    Parkinson disease ([facility] [address]          Review of Systems     Constitutional:  No weight loss, no fever/chills  Eyes:  No eye pain, no eye redness  Cardiovascular:  No chest pain, no worsening of edema  Respiratory:  No hemoptysis, no stidor  Gastrointestinal:  No blood in stool, + n/v, no diarrhea  Genitoruinary:  No hematuria, no difficulty with urination  Musculoskeletal:  No joint swelling, no redness  Integumentary:  No Rash, no itching  Neurological:  No focal weakness, No new sensory deficit  Psychiatric:  + depression, +confusion  Endocrine:  No polyuria, no polydipsia       Medications Current Facility-Administered Medications:     pyridoxine (B-6) injection 100 mg, 100 mg, Intravenous, Daily, Yanelis Nunez MD, 100 mg at 07/20/21 1933    thiamine (B-1) injection 100 mg, 100 mg, Intravenous, Daily, Yanelis Nunez MD, 100 mg at 07/20/21 1836    0.9 % sodium chloride infusion, , Intravenous, Continuous, Yanelis Nunez MD, Last Rate: 100 mL/hr at 07/21/21 0754, New Bag at 07/21/21 0754    sodium chloride flush 0.9 % injection 5-40 mL, 5-40 mL, Intravenous, 2 times per day, Yanelis Nunez MD    sodium chloride flush 0.9 % injection 5-40 mL, 5-40 mL, Intravenous, PRN, Yanelis Nunez MD    0.9 % sodium chloride infusion, 25 mL, Intravenous, PRN, Yanelis Nunez MD    enoxaparin (LOVENOX) injection 40 mg, 40 mg, Subcutaneous, Daily, Yanelis Nunez MD    polyethylene glycol (GLYCOLAX) packet 17 g, 17 g, Oral, Daily PRN, Yanelis Nunez MD    acetaminophen (TYLENOL) tablet 650 mg, 650 mg, Oral, Q6H PRN **OR** acetaminophen (TYLENOL) suppository 650 mg, 650 mg, Rectal, Q6H PRN, Yanelis Nunez MD    ondansetron (ZOFRAN) injection 4 mg, 4 mg, Intravenous, Q6H PRN, Yanelis Nunez MD, 4 mg at 07/21/21 6055    famotidine (PEPCID) injection 20 mg, 20 mg, Intravenous, Daily, Yanelis Nunez MD, 20 mg at 07/20/21 8159    nicotine (NICODERM CQ) 21 MG/24HR 1 patch, 1 patch, Transdermal, Daily, Yanelis Nunez MD  No outpatient medications have been marked as taking for the 7/20/21 encounter [facility] Encounter). Allergies   Patient has no known allergies. Family History       Family History   Family history unknown: Yes     Family history negative for kidney disease.      Social History      Social History     Socioeconomic History    Marital status:      Spouse name: None    Number of children: None    Years of education: None    Highest education level: None   Occupational History    None   Tobacco Use    Smoking status: Current Every Day Smoker     Packs/day: 0.50     Types: Cigarettes    Smokeless tobacco: Never Used   Vaping Use    Vaping Use: Never used   Substance and Sexual Activity    Alcohol use: Not Currently    Drug use: Not Currently    Sexual activity: None   Other Topics Concern    None   Social History Narrative    None     Social Determinants of Health     Financial Resource Strain:     Difficulty of Paying Living Expenses:    Food Insecurity:     Worried About Running Out of Food in the Last Year:     Ran Out of Food in the Last Year:    Transportation Needs:     Lack of Transportation (Medical):  Lack of Transportation (Non-Medical):    Physical Activity:     Days of Exercise per Week:     Minutes of Exercise per Session:    Stress:     Feeling of Stress :    Social Connections:     Frequency of Communication with Friends and Family:     Frequency of Social Gatherings with Friends and Family:     Attends [employer] Services:     Active Member of Clubs or Organizations:     Attends Club or Organization Meetings:     Marital Status:    Intimate Partner Violence:     Fear of Current or Ex-Partner:     Emotionally Abused:     Physically Abused:     Sexually Abused:        Physical Exam     Blood pressure 121/61, pulse 64, temperature 98.2 °F (36.8 °C), temperature source Temporal, resp. rate 23, height 5' 9\" (1.753 m), weight 194 lb 1.6 oz (88 kg), SpO2 93 %.     General:  NAD, A+Ox3, ill-appearing, normal body habitus  HEENT: Atraumatic, normocephalic  Neck:  Supple, normal range of movement  Chest:  CTAB, good respiratory effort, good air movement  CV:  RRR, no murmurs   Abdomen:  NTND, soft, +BS, no hepatosplenomegaly  Extremities:  No peripheral edema  Neurological:  Moving all four extremities  Lymphatics:  No palpable lymph nodes   Skin:  No rash, no jaundice  Psychiatric:  Normal insight and judgement, good recall    Data     Recent Labs 07/20/21 1820 07/21/21  0151   WBC 4.5* 4.8   HGB 11.9* 11.8*   HCT 35.5* 35.5*   MCV 91.0 91.5    196     Recent Labs     07/20/21  1820 07/21/21  0151    139   K 3.7 3.9    105   CO2 22 26   GLUCOSE 99 101   BUN 26* 24*   CREATININE 0.9 0.9   LABGLOM >60 >60   GFRAA >59 >59       Assessment:  1. Acute intoxication with antifreeze (ethylene glycol)  2. Mild elevation of BUN  3. Depression/suicidal attempt  4. Positive urine drug screen for amphetamine      Plan:  · Patient is found with some calcium oxalate crystals in his urine in favor of glycol toxicity. His serum osmolality have been less than 300. He is status post 1 dose of fomepizole. His neuro status is stable as well as his hemodynamics and renal function. Patient will need a close monitoring in ICU setting. Agree with IV fluids for now. He also had a sitter for suicide watch. Follow-up labs. Avoid hypotension nephrotoxins.     Thank you for asking us to participate in the management of your patient, please do not hesitate to contact me for any concerns regarding my recommendations as outlined above.    -----------------------------  Electronically signed by Bronson Favre, MD on 7/21/21 at 8:36 AM CDT

## 2021-07-21 NOTE — PROGRESS NOTES
Sandra Wilcox received from ICU to room # 428 . Mental Status: Patient is oriented. Vitals:    07/21/21 1143   BP: (!) 154/84   Pulse: 68   Resp: 20   Temp: 97.6 °F (36.4 °C)   SpO2: 97%     Placed on cardiac monitor: No.  Belongings: Glasses with patient at bedside . Family at bedside No.  Oriented Patient to room. Call light within reach. Yes. Transfer was: Poorly tolerated by patient. Doctor notified.      Electronically signed by Israel Medrano RN on 7/21/2021 at 12:00pm.
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
## 2021-07-21 NOTE — ED NOTES
Pt c/o of stabbing chest pain that radiates to left arm, pain lasted for about 1 minute then subsided. Pt states he has been having intermittent chest pains for about a month, but has not seen a doctor.  Pt states that he has not seen a doctor in 2-3 years     Hang Ron RN  07/20/21 2100

## 2021-07-21 NOTE — CONSULTS
**Physician Signature**  This document was electronically signed by: Miguelangel Barker MD  2021   10:15 PM    **Consult Information**  Member Facility: [address] [place] [address] Drive MRN: [id number]  Visit/Encounter Number: [id number]  Consult ID: [id number]  Facility Time Zone: CT  Date and Time of Request: 2021 09:43 PM  CT  Requesting Clinician: Sandy Barnes MD  Patient Name: Simona Mojica  YOB: 1950  Gender: Male  Patient identity was confirmed at the beginning of the consult with the   patient/family/staff using two personal identifiers: Patient name and       **Reason for Consult**  Reason for Consult: [facility]    **Admission**  Admission Date: 2021  Chief reason for ICU admission: suicide attempt    **Core Metrics**  General orienting sentence for patient: 71 yo M admitted  after suicide   attempt with antifreeze.   Chief physiologic deterioration: None - Stable patient  Is the patient on DVT prophylaxis?: No  Is the patient on GI prophylaxis?: Yes  Has this patient reached their nutritional goal?: No and issues are being   addressed  Are there current issues with pain management in this patient?:   No  Are there issues with skin integrity?: No  Are there issues with delirium?: No  Has the patient been mobilized?: No  Is this patient currently intubated?: No  Are there ethical or care philosophy or family issues?: No  Do you recommend an in depth evaluation?: No  Disposition Recommendations: Continue ICU level of care    **Physician Signature**  This document was electronically signed by: Miguelangel Barker MD  2021   10:15 PM

## 2021-07-21 NOTE — PROGRESS NOTES
Patient states that he \"is going to trista this place. \" Patient states that he \"will start swinging. \" He was stating this to me and the technicians from lab. He is alert, agitated and verbally abusive.  Electronically signed by Mari Laguna RN on 7/21/2021 at 7:01 PM
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
## 2021-07-21 NOTE — CARE COORDINATION
Called lab concerning ethylene glycol level. Confirmed that lab was collected in ED and will be sent in the AM.  Additional dose of Antizol also ordered this AM per Dr. Natali Medina.

## 2021-07-21 NOTE — ED NOTES
Unable to place indwelling catheter after 2 RNS attempted. Pt has had difficulty in the past with catheter placement.       Pastora Ray RN  07/20/21 1945

## 2021-07-21 NOTE — PROGRESS NOTES
Hospitalist Progress Note  [facility]     Patient: Brent Hsieh  : 1950  MRN: [id number]  Code Status: Full Code    Hospital Day: 1   Date of Service: 2021    Subjective:   Patient seen and examined. Threatening to leave AMA. No current complaints. Past Medical History:   Diagnosis Date    Parkinson disease [facility])        Past Surgical History:   Procedure Laterality Date    BLADDER SURGERY      TURB       Family History   Family history unknown: Yes       Social History     Socioeconomic History    Marital status:      Spouse name: Not on file    Number of children: Not on file    Years of education: Not on file    Highest education level: Not on file   Occupational History    Not on file   Tobacco Use    Smoking status: Current Every Day Smoker     Packs/day: 0.50     Types: Cigarettes    Smokeless tobacco: Never Used   Vaping Use    Vaping Use: Never used   Substance and Sexual Activity    Alcohol use: Not Currently    Drug use: Not Currently    Sexual activity: Not on file   Other Topics Concern    Not on file   Social History Narrative    Not on file     Social Determinants of Health     Financial Resource Strain:     Difficulty of Paying Living Expenses:    Food Insecurity:     Worried About Running Out of Food in the Last Year:     [place]ka of Food in the Last Year:    Transportation Needs:     Lack of Transportation (Medical):      Lack of Transportation (Non-Medical):    Physical Activity:     Days of Exercise per Week:     Minutes of Exercise per Session:    Stress:     Feeling of Stress :    Social Connections:     Frequency of Communication with Friends and Family:     Frequency of Social Gatherings with Friends and Family:     Attends L[employer] day Services:     Active Member of Clubs or Organizations:     Attends Club or Organization Meetings:     Marital Status:    Intimate Partner Violence:     Fear of Current or Ex-Partner:     Emotionally Abused:     Physically Abused:     Sexually Abused:        Current Facility-Administered Medications   Medication Dose Route Frequency Provider Last Rate Last Admin    pyridoxine (B-6) injection 100 mg  100 mg Intravenous Daily Daria Kocher, MD   100 mg at 07/20/21 1933    thiamine (B-1) injection 100 mg  100 mg Intravenous Daily Daria Kocher, MD   100 mg at 07/20/21 1836    0.9 % sodium chloride infusion   Intravenous Continuous Daria Kocher,  mL/hr at 07/21/21 0754 New Bag at 07/21/21 0754    sodium chloride flush 0.9 % injection 5-40 mL  5-40 mL Intravenous 2 times per day Daria Kocher, MD        sodium chloride flush 0.9 % injection 5-40 mL  5-40 mL Intravenous PRN Daria Kocher, MD        0.9 % sodium chloride infusion  25 mL Intravenous PRN Daria Kocher, MD        enoxaparin (LOVENOX) injection 40 mg  40 mg Subcutaneous Daily Daria Kocher, MD        polyethylene glycol (GLYCOLAX) packet 17 g  17 g Oral Daily PRN Daria Kocher, MD        acetaminophen (TYLENOL) tablet 650 mg  650 mg Oral Q6H PRN Daria Kocher, MD        Or    acetaminophen (TYLENOL) suppository 650 mg  650 mg Rectal Q6H PRN Daria Kocher, MD        ondansetron (ZOFRAN) injection 4 mg  4 mg Intravenous Q6H PRN Daria Kocher, MD   4 mg at 07/21/21 0337    famotidine (PEPCID) injection 20 mg  20 mg Intravenous Daily Daria Kocher, MD   20 mg at 07/20/21 8619    nicotine (NICODERM CQ) 21 MG/24HR 1 patch  1 patch Transdermal Daily Daria Kocher, MD             sodium chloride 100 mL/hr at 07/21/21 0754    sodium chloride          Objective:   /61   Pulse 64   Temp 98.2 °F (36.8 °C) (Temporal)   Resp 23   Ht 5' 9\" (1.753 m)   Wt 194 lb 1.6 oz (88 kg)   SpO2 93%   BMI 28.66 kg/m²     General: no acute distress  HEENT: normocephalic, atraumatic  Neck: supple, symmetrical, trachea midline   Lungs: clear to auscultation bilaterally   Cardiovascular: s1 and s2 normal  Abdomen: soft, positive bowel sounds  Extremities: no edema or cyanosis   Neuro: aaox3, answering all questions appropriately, no focal deficits, denies SI/HI  Skin: normal color and texture     Recent Labs     07/20/21 1820 07/21/21  0151   WBC 4.5* 4.8   RBC 3.90* 3.88*   HGB 11.9* 11.8*   HCT 35.5* 35.5*   MCV 91.0 91.5   MCH 30.5 30.4   MCHC 33.5 33.2    196     Recent Labs     07/20/21 1820 07/21/21  0151    139   K 3.7 3.9   ANIONGAP 11 8    105   CO2 22 26   BUN 26* 24*   CREATININE 0.9 0.9   GLUCOSE 99 101   CALCIUM 9.1 8.7*     No results for input(s): MG, PHOS in the last 72 hours. Recent Labs     07/20/21 1820 07/21/21  0151   AST 22 19   ALT 20 20   BILITOT 0.5 0.5   ALKPHOS 86 82     No results for input(s): PH, PO2, PCO2, HCO3, BE, O2SAT in the last 72 hours. Recent Labs     07/20/21 1820   TROPONINI <0.01     Recent Labs     07/20/21 1820   INR 1.11     Recent Labs     07/21/21 0151   LACTA 1.1         Intake/Output Summary (Last 24 hours) at 7/21/2021 0939  Last data filed at 7/21/2021 0604  Gross per 24 hour   Intake 1089.11 ml   Output --   Net 1089.11 ml       XR CHEST PORTABLE    Result Date: 7/20/2021  EXAM: XR CHEST PORTABLE - 7/20/2021 6:24 PM INDICATION: Altered mental status COMPARISON: None available. FINDINGS: Cardiac silhouette is normal in size. No pleural effusion or visible pneumothorax. No focal airspace opacity. No acute osseous finding. No acute findings.  Signed by Dr Salvatore Corey and Plan:   Intentional drug overdose  Admits to drinking antifreeze  Counseled  Renal following  Poison control following  Fomepizole administered  Fluids per renal  Follow labs  Protecting airway  Follow EKG  Telemetry monitoring  UDS positive for amphetamine  Counseled  Acetaminophen <75  Salicylate <3  One-to-one observation  Denies SI/HI    DVT prophylaxis  Lovenox    Patient threatening to leave AMA. Patient is alert and oriented x3 and has capacity to make informed decisions. Patient understands that leaving AMA could result in serious bodily harm and/or death. He also understands that he has the right to return to the ER with any concerning signs or symptoms if he ultimately left AMA. I strongly advised patient that it was in his best interest to remain in the hospital until he completed his treatment course. Patient verbalized understanding to all of the above. Addendum: Informed by staff that a 72-hour hold is in effect on this patient therefore patient unable to leave AMA. Security/police will be called immediately as needed.     Total critical care time: 45 minutes    Melanie Brown MD   7/21/2021 9:39 AM

## 2021-07-22 ENCOUNTER — HOSPITAL ENCOUNTER (INPATIENT)
Age: 71
LOS: 4 days | Discharge: HOME | DRG: 885 | End: 2021-07-26
Attending: PSYCHIATRY & NEUROLOGY | Admitting: PSYCHIATRY & NEUROLOGY
Payer: MEDICARE

## 2021-07-22 VITALS
BODY MASS INDEX: 28.75 KG/M2 | HEART RATE: 90 BPM | OXYGEN SATURATION: 93 % | SYSTOLIC BLOOD PRESSURE: 135 MMHG | WEIGHT: 194.1 LBS | DIASTOLIC BLOOD PRESSURE: 69 MMHG | TEMPERATURE: 97.5 F | HEIGHT: 69 IN | RESPIRATION RATE: 20 BRPM

## 2021-07-22 PROBLEM — R45.851 SUICIDAL IDEATIONS: Status: ACTIVE | Noted: 2021-07-22

## 2021-07-22 LAB
ALBUMIN SERPL-MCNC: 3.4 G/DL (ref 3.5–5.2)
ALBUMIN SERPL-MCNC: 3.5 G/DL (ref 3.5–5.2)
ALBUMIN SERPL-MCNC: 3.6 G/DL (ref 3.5–5.2)
ALBUMIN SERPL-MCNC: 3.7 G/DL (ref 3.5–5.2)
ALP BLD-CCNC: 83 U/L (ref 40–130)
ALP BLD-CCNC: 89 U/L (ref 40–130)
ALP BLD-CCNC: 89 U/L (ref 40–130)
ALP BLD-CCNC: 90 U/L (ref 40–130)
ALT SERPL-CCNC: 17 U/L (ref 5–41)
ALT SERPL-CCNC: 17 U/L (ref 5–41)
ALT SERPL-CCNC: 18 U/L (ref 5–41)
ALT SERPL-CCNC: 18 U/L (ref 5–41)
ANION GAP SERPL CALCULATED.3IONS-SCNC: 6 MMOL/L (ref 7–19)
ANION GAP SERPL CALCULATED.3IONS-SCNC: 7 MMOL/L (ref 7–19)
ANION GAP SERPL CALCULATED.3IONS-SCNC: 7 MMOL/L (ref 7–19)
ANION GAP SERPL CALCULATED.3IONS-SCNC: 9 MMOL/L (ref 7–19)
AST SERPL-CCNC: 14 U/L (ref 5–40)
AST SERPL-CCNC: 17 U/L (ref 5–40)
BASOPHILS ABSOLUTE: 0 K/UL (ref 0–0.2)
BASOPHILS RELATIVE PERCENT: 0.8 % (ref 0–1)
BILIRUB SERPL-MCNC: 0.3 MG/DL (ref 0.2–1.2)
BILIRUB SERPL-MCNC: 0.3 MG/DL (ref 0.2–1.2)
BILIRUB SERPL-MCNC: 0.4 MG/DL (ref 0.2–1.2)
BILIRUB SERPL-MCNC: 0.5 MG/DL (ref 0.2–1.2)
BUN BLDV-MCNC: 17 MG/DL (ref 8–23)
BUN BLDV-MCNC: 18 MG/DL (ref 8–23)
BUN BLDV-MCNC: 19 MG/DL (ref 8–23)
BUN BLDV-MCNC: 20 MG/DL (ref 8–23)
CALCIUM SERPL-MCNC: 8.6 MG/DL (ref 8.8–10.2)
CALCIUM SERPL-MCNC: 8.7 MG/DL (ref 8.8–10.2)
CALCIUM SERPL-MCNC: 8.9 MG/DL (ref 8.8–10.2)
CALCIUM SERPL-MCNC: 9 MG/DL (ref 8.8–10.2)
CHLORIDE BLD-SCNC: 104 MMOL/L (ref 98–111)
CHLORIDE BLD-SCNC: 106 MMOL/L (ref 98–111)
CO2: 26 MMOL/L (ref 22–29)
CO2: 29 MMOL/L (ref 22–29)
CO2: 29 MMOL/L (ref 22–29)
CO2: 30 MMOL/L (ref 22–29)
CREAT SERPL-MCNC: 0.9 MG/DL (ref 0.5–1.2)
CREAT SERPL-MCNC: 0.9 MG/DL (ref 0.5–1.2)
CREAT SERPL-MCNC: 1 MG/DL (ref 0.5–1.2)
CREAT SERPL-MCNC: 1.1 MG/DL (ref 0.5–1.2)
EKG P AXIS: 22 DEGREES
EKG P-R INTERVAL: 214 MS
EKG Q-T INTERVAL: 438 MS
EKG QRS DURATION: 102 MS
EKG QTC CALCULATION (BAZETT): 448 MS
EKG T AXIS: 71 DEGREES
EOSINOPHILS ABSOLUTE: 0.2 K/UL (ref 0–0.6)
EOSINOPHILS RELATIVE PERCENT: 5.7 % (ref 0–5)
GFR AFRICAN AMERICAN: >59
GFR NON-AFRICAN AMERICAN: >60
GLUCOSE BLD-MCNC: 108 MG/DL (ref 74–109)
GLUCOSE BLD-MCNC: 88 MG/DL (ref 74–109)
GLUCOSE BLD-MCNC: 98 MG/DL (ref 74–109)
GLUCOSE BLD-MCNC: 98 MG/DL (ref 74–109)
HCT VFR BLD CALC: 35.3 % (ref 42–52)
HCT VFR BLD CALC: 37.2 % (ref 42–52)
HCT VFR BLD CALC: 37.7 % (ref 42–52)
HCT VFR BLD CALC: 38.5 % (ref 42–52)
HEMOGLOBIN: 11.7 G/DL (ref 14–18)
HEMOGLOBIN: 12.1 G/DL (ref 14–18)
HEMOGLOBIN: 12.6 G/DL (ref 14–18)
HEMOGLOBIN: 12.6 G/DL (ref 14–18)
IMMATURE GRANULOCYTES #: 0 K/UL
LACTIC ACID: 0.5 MMOL/L (ref 0.5–1.9)
LACTIC ACID: 0.5 MMOL/L (ref 0.5–1.9)
LACTIC ACID: 0.7 MMOL/L (ref 0.5–1.9)
LACTIC ACID: 0.9 MMOL/L (ref 0.5–1.9)
LYMPHOCYTES ABSOLUTE: 1 K/UL (ref 1.1–4.5)
LYMPHOCYTES RELATIVE PERCENT: 25.5 % (ref 20–40)
MAGNESIUM: 1.8 MG/DL (ref 1.6–2.4)
MCH RBC QN AUTO: 29.9 PG (ref 27–31)
MCH RBC QN AUTO: 30.1 PG (ref 27–31)
MCH RBC QN AUTO: 30.4 PG (ref 27–31)
MCH RBC QN AUTO: 30.7 PG (ref 27–31)
MCHC RBC AUTO-ENTMCNC: 32.5 G/DL (ref 33–37)
MCHC RBC AUTO-ENTMCNC: 32.7 G/DL (ref 33–37)
MCHC RBC AUTO-ENTMCNC: 33.1 G/DL (ref 33–37)
MCHC RBC AUTO-ENTMCNC: 33.4 G/DL (ref 33–37)
MCV RBC AUTO: 91.1 FL (ref 80–94)
MCV RBC AUTO: 91.4 FL (ref 80–94)
MCV RBC AUTO: 92.5 FL (ref 80–94)
MCV RBC AUTO: 92.7 FL (ref 80–94)
MONOCYTES ABSOLUTE: 0.4 K/UL (ref 0–0.9)
MONOCYTES RELATIVE PERCENT: 9.9 % (ref 0–10)
NEUTROPHILS ABSOLUTE: 2.2 K/UL (ref 1.5–7.5)
NEUTROPHILS RELATIVE PERCENT: 57.6 % (ref 50–65)
OSMOLALITY: 287 MOSM/KG (ref 275–300)
OSMOLALITY: 289 MOSM/KG (ref 275–300)
OSMOLALITY: 293 MOSM/KG (ref 275–300)
OSMOLALITY: 293 MOSM/KG (ref 275–300)
PDW BLD-RTO: 13.3 % (ref 11.5–14.5)
PDW BLD-RTO: 13.4 % (ref 11.5–14.5)
PDW BLD-RTO: 13.4 % (ref 11.5–14.5)
PDW BLD-RTO: 13.5 % (ref 11.5–14.5)
PLATELET # BLD: 172 K/UL (ref 130–400)
PLATELET # BLD: 175 K/UL (ref 130–400)
PLATELET # BLD: 184 K/UL (ref 130–400)
PLATELET # BLD: 199 K/UL (ref 130–400)
PMV BLD AUTO: 9.1 FL (ref 9.4–12.4)
PMV BLD AUTO: 9.2 FL (ref 9.4–12.4)
PMV BLD AUTO: 9.4 FL (ref 9.4–12.4)
PMV BLD AUTO: 9.6 FL (ref 9.4–12.4)
POTASSIUM REFLEX MAGNESIUM: 3.8 MMOL/L (ref 3.5–5)
POTASSIUM REFLEX MAGNESIUM: 3.9 MMOL/L (ref 3.5–5)
POTASSIUM REFLEX MAGNESIUM: 4.1 MMOL/L (ref 3.5–5)
POTASSIUM REFLEX MAGNESIUM: 4.2 MMOL/L (ref 3.5–5)
RBC # BLD: 3.81 M/UL (ref 4.7–6.1)
RBC # BLD: 4.02 M/UL (ref 4.7–6.1)
RBC # BLD: 4.14 M/UL (ref 4.7–6.1)
RBC # BLD: 4.21 M/UL (ref 4.7–6.1)
SODIUM BLD-SCNC: 139 MMOL/L (ref 136–145)
SODIUM BLD-SCNC: 140 MMOL/L (ref 136–145)
SODIUM BLD-SCNC: 140 MMOL/L (ref 136–145)
SODIUM BLD-SCNC: 142 MMOL/L (ref 136–145)
TOTAL CK: 194 U/L (ref 39–308)
TOTAL PROTEIN: 6.6 G/DL (ref 6.6–8.7)
TOTAL PROTEIN: 6.7 G/DL (ref 6.6–8.7)
TOTAL PROTEIN: 7.2 G/DL (ref 6.6–8.7)
TOTAL PROTEIN: 7.3 G/DL (ref 6.6–8.7)
URIC ACID, SERUM: 8.1 MG/DL (ref 3.4–7)
WBC # BLD: 3.9 K/UL (ref 4.8–10.8)
WBC # BLD: 4.3 K/UL (ref 4.8–10.8)
WBC # BLD: 4.4 K/UL (ref 4.8–10.8)
WBC # BLD: 4.4 K/UL (ref 4.8–10.8)

## 2021-07-22 PROCEDURE — 83930 ASSAY OF BLOOD OSMOLALITY: CPT

## 2021-07-22 PROCEDURE — 83605 ASSAY OF LACTIC ACID: CPT

## 2021-07-22 PROCEDURE — 85027 COMPLETE CBC AUTOMATED: CPT

## 2021-07-22 PROCEDURE — 1240000000 HC EMOTIONAL WELLNESS R&B

## 2021-07-22 PROCEDURE — 83735 ASSAY OF MAGNESIUM: CPT

## 2021-07-22 PROCEDURE — 82550 ASSAY OF CK (CPK): CPT

## 2021-07-22 PROCEDURE — 99221 1ST HOSP IP/OBS SF/LOW 40: CPT | Performed by: PSYCHIATRY & NEUROLOGY

## 2021-07-22 PROCEDURE — 6360000002 HC RX W HCPCS: Performed by: PSYCHIATRY & NEUROLOGY

## 2021-07-22 PROCEDURE — 80053 COMPREHEN METABOLIC PANEL: CPT

## 2021-07-22 PROCEDURE — 84550 ASSAY OF BLOOD/URIC ACID: CPT

## 2021-07-22 PROCEDURE — 85025 COMPLETE CBC W/AUTO DIFF WBC: CPT

## 2021-07-22 PROCEDURE — 6360000002 HC RX W HCPCS: Performed by: INTERNAL MEDICINE

## 2021-07-22 PROCEDURE — 2580000003 HC RX 258: Performed by: INTERNAL MEDICINE

## 2021-07-22 PROCEDURE — 36415 COLL VENOUS BLD VENIPUNCTURE: CPT

## 2021-07-22 RX ORDER — POLYETHYLENE GLYCOL 3350 17 G/17G
17 POWDER, FOR SOLUTION ORAL DAILY PRN
Status: CANCELLED | OUTPATIENT
Start: 2021-07-22

## 2021-07-22 RX ORDER — ACETAMINOPHEN 650 MG/1
650 SUPPOSITORY RECTAL EVERY 6 HOURS PRN
Status: CANCELLED | OUTPATIENT
Start: 2021-07-22

## 2021-07-22 RX ORDER — SODIUM CHLORIDE 0.9 % (FLUSH) 0.9 %
5-40 SYRINGE (ML) INJECTION PRN
Status: CANCELLED | OUTPATIENT
Start: 2021-07-22

## 2021-07-22 RX ORDER — HALOPERIDOL 5 MG/ML
5 INJECTION INTRAMUSCULAR ONCE
Status: COMPLETED | OUTPATIENT
Start: 2021-07-22 | End: 2021-07-22

## 2021-07-22 RX ORDER — NICOTINE 21 MG/24HR
1 PATCH, TRANSDERMAL 24 HOURS TRANSDERMAL DAILY
Status: CANCELLED | OUTPATIENT
Start: 2021-07-23

## 2021-07-22 RX ORDER — SODIUM CHLORIDE 9 MG/ML
25 INJECTION, SOLUTION INTRAVENOUS PRN
Status: CANCELLED | OUTPATIENT
Start: 2021-07-22

## 2021-07-22 RX ORDER — ACETAMINOPHEN 325 MG/1
650 TABLET ORAL EVERY 6 HOURS PRN
Status: CANCELLED | OUTPATIENT
Start: 2021-07-22

## 2021-07-22 RX ORDER — SODIUM CHLORIDE 9 MG/ML
INJECTION, SOLUTION INTRAVENOUS CONTINUOUS
Status: CANCELLED | OUTPATIENT
Start: 2021-07-22

## 2021-07-22 RX ORDER — LORAZEPAM 2 MG/ML
2 INJECTION INTRAMUSCULAR EVERY 6 HOURS PRN
Status: DISCONTINUED | OUTPATIENT
Start: 2021-07-22 | End: 2021-07-23

## 2021-07-22 RX ORDER — HALOPERIDOL 5 MG/ML
5 INJECTION INTRAMUSCULAR EVERY 6 HOURS PRN
Status: DISCONTINUED | OUTPATIENT
Start: 2021-07-22 | End: 2021-07-26 | Stop reason: HOSPADM

## 2021-07-22 RX ORDER — ACETAMINOPHEN 325 MG/1
650 TABLET ORAL EVERY 4 HOURS PRN
Status: DISCONTINUED | OUTPATIENT
Start: 2021-07-22 | End: 2021-07-26 | Stop reason: HOSPADM

## 2021-07-22 RX ORDER — THIAMINE HYDROCHLORIDE 100 MG/ML
100 INJECTION, SOLUTION INTRAMUSCULAR; INTRAVENOUS DAILY
Status: CANCELLED | OUTPATIENT
Start: 2021-07-23

## 2021-07-22 RX ORDER — ONDANSETRON 2 MG/ML
4 INJECTION INTRAMUSCULAR; INTRAVENOUS EVERY 6 HOURS PRN
Status: CANCELLED | OUTPATIENT
Start: 2021-07-22

## 2021-07-22 RX ORDER — SODIUM CHLORIDE 0.9 % (FLUSH) 0.9 %
5-40 SYRINGE (ML) INJECTION EVERY 12 HOURS SCHEDULED
Status: CANCELLED | OUTPATIENT
Start: 2021-07-22

## 2021-07-22 RX ORDER — POLYETHYLENE GLYCOL 3350 17 G/17G
17 POWDER, FOR SOLUTION ORAL DAILY PRN
Status: DISCONTINUED | OUTPATIENT
Start: 2021-07-22 | End: 2021-07-26 | Stop reason: HOSPADM

## 2021-07-22 RX ORDER — LORAZEPAM 2 MG/ML
1 INJECTION INTRAMUSCULAR
Status: CANCELLED | OUTPATIENT
Start: 2021-07-22

## 2021-07-22 RX ADMIN — LORAZEPAM 1 MG: 2 INJECTION INTRAMUSCULAR; INTRAVENOUS at 14:54

## 2021-07-22 RX ADMIN — SODIUM CHLORIDE, PRESERVATIVE FREE 10 ML: 5 INJECTION INTRAVENOUS at 01:37

## 2021-07-22 RX ADMIN — HALOPERIDOL LACTATE 5 MG: 5 INJECTION, SOLUTION INTRAMUSCULAR at 14:54

## 2021-07-22 NOTE — PROGRESS NOTES
Nephrology ([facility]) Progress Note    Patient:  Hari Doshi  YOB: 1950  Date of Service: 7/22/2021  MRN: [id number]   Acct: [de-identified]   Primary Care Physician: No primary care provider on file. Advance Directive: Full Code  Admit Date: 7/20/2021       Hospital Day: 2  Referring Provider: Edith Hsieh MD    Patient Seen, Chart, Consults notes, Labs, Radiology studies reviewed. Subjective:  Patient is a 64-year-old pleasant man with past medical history of hematuria and bladder tumors. He stated that he had episode of acute kidney injury. He was brought by family after he was increasingly depressed. Due to some family problems and fights, he felt lonely and wanted to take away his life so he took 32 ounces of antifreeze that he found in his garage. Later his family was where and police was summoned. Patient was also having some nausea and vomiting. He was a bit confused with some altered mental status. He is currently in ICU at [facility]) in [place]. Fomepizole was administered. His labs did not show any significant electrolyte disturbances or any major acidosis or kidney failure. He was receiving IV fluids. Renal service was consulted to help manage the drug overdose. Patient denies any recent dysuria or hematuria. Today, he was agitated and he required soft wrist restraint. Allergies:  Patient has no known allergies.     Medicines:  Current Facility-Administered Medications   Medication Dose Route Frequency Provider Last Rate Last Admin    haloperidol lactate (HALDOL) injection 5 mg  5 mg Intramuscular Once Dereck Jimenez MD        LORazepam (ATIVAN) injection 1 mg  1 mg Intravenous Q1H PRN Erica Pandey MD   1 mg at 07/21/21 1939    pyridoxine (B-6) injection 100 mg  100 mg Intravenous Daily Ernestina Sinha MD   100 mg at 07/20/21 1933    thiamine (B-1) injection 100 mg  100 mg Intravenous Daily Ernestina Sinha MD   100 mg at 07/21/21 0957    0.9 % sodium chloride infusion   Intravenous Continuous Andre Kang  mL/hr at 07/21/21 0754 New Bag at 07/21/21 0754    sodium chloride flush 0.9 % injection 5-40 mL  5-40 mL Intravenous 2 times per day Andre Kang MD   10 mL at 07/22/21 4005    sodium chloride flush 0.9 % injection 5-40 mL  5-40 mL Intravenous PRN Andre Kang MD        0.9 % sodium chloride infusion  25 mL Intravenous PRN Andre Kang MD        enoxaparin (LOVENOX) injection 40 mg  40 mg Subcutaneous Daily Andre Kang MD        polyethylene glycol (GLYCOLAX) packet 17 g  17 g Oral Daily PRN Andre Kang MD        acetaminophen (TYLENOL) tablet 650 mg  650 mg Oral Q6H PRN Andre Kang MD        Or    acetaminophen (TYLENOL) suppository 650 mg  650 mg Rectal Q6H PRN Andre Kang MD        ondansetron (ZOFRAN) injection 4 mg  4 mg Intravenous Q6H PRN Andre Kang MD   4 mg at 07/21/21 9349    famotidine (PEPCID) injection 20 mg  20 mg Intravenous Daily Andre Kang MD   20 mg at 07/21/21 0957    nicotine (NICODERM CQ) 21 MG/24HR 1 patch  1 patch Transdermal Daily Andre Kang MD           Past Medical History:  Past Medical History:   Diagnosis Date    Parkinson disease [facility])        Past Surgical History:  Past Surgical History:   Procedure Laterality Date    BLADDER SURGERY      TURB       Family History  Family History   Family history unknown: Yes       Social History  Social History     Socioeconomic History    Marital status:      Spouse name: Not on file    Number of children: Not on file    Years of education: Not on file    Highest education level: Not on file   Occupational History    Not on file   Tobacco Use    Smoking status: Current Every Day Smoker     Packs/day: 0.50     Types: Cigarettes    Smokeless tobacco: Never Used   Vaping Use    Vaping Use: Never used rhythm  Abdominal: soft, nontender, normal bowel sounds  Extremities: no cyanosis or edema  Skin: warm and dry without rash    Labs:  BMP:   Recent Labs     07/22/21  0116 07/22/21  0656 07/22/21  0936    140 140   K 4.1 4.2 3.8    104 104   CO2 30* 29 29   BUN 20 19 18   CREATININE 1.1 1.0 0.9   CALCIUM 8.6* 9.0 8.9     CBC:   Recent Labs     07/22/21  0116 07/22/21  0656 07/22/21  0936   WBC 4.3* 3.9* 4.4*   HGB 11.7* 12.1* 12.6*   HCT 35.3* 37.2* 38.5*   MCV 92.7 92.5 91.4    184 175     LIVER PROFILE:   Recent Labs     07/22/21  0116 07/22/21  0656 07/22/21  0936   AST 17 17 17   ALT 17 18 18   BILITOT 0.3 0.4 0.5   ALKPHOS 83 89 90     PT/INR:   Recent Labs     07/20/21  1820   PROTIME 14.5   INR 1.11     APTT:   Recent Labs     07/20/21  1820   APTT 27.9     BNP:  No results for input(s): BNP in the last 72 hours. Ionized Calcium:No results for input(s): IONCA in the last 72 hours. Magnesium:  Recent Labs     07/22/21  0656   MG 1.8     Phosphorus:No results for input(s): PHOS in the last 72 hours. HgbA1C: No results for input(s): LABA1C in the last 72 hours. Hepatic:   Recent Labs     07/22/21  0116 07/22/21  0656 07/22/21  0936   ALKPHOS 83 89 90   ALT 17 18 18   AST 17 17 17   PROT 6.6 6.7 7.3   BILITOT 0.3 0.4 0.5   LABALBU 3.4* 3.5 3.6     Lactic Acid:   Recent Labs     07/22/21  0936   LACTA 0.5     Troponin:   Recent Labs     07/22/21  0656   CKTOTAL 194     ABGs: No results for input(s): PH, PCO2, PO2, HCO3, O2SAT in the last 72 hours. CRP:  No results for input(s): CRP in the last 72 hours. Sed Rate:  No results for input(s): SEDRATE in the last 72 hours. Cultures:   No results for input(s): CULTURE in the last 72 hours. Radiology reports as per the Radiologist  Radiology: XR CHEST PORTABLE    Result Date: 7/20/2021  EXAM: XR CHEST PORTABLE - 7/20/2021 6:24 PM INDICATION: Altered mental status COMPARISON: None available. FINDINGS: Cardiac silhouette is normal in size.  No

## 2021-07-22 NOTE — PROGRESS NOTES
Patient OK to be cleared medically by poison control.     Electronically signed by Josey Epstein RN on 7/22/2021 at 3:01 PM

## 2021-07-22 NOTE — CONSULTS
[facility]  Psychiatry Consult    Reason for Consult: Concern   Suicidal ideations    The primary source(s) of information include(s):  patient, documentation: Patient's chart    The patient is a 72 y.o. male with no reported previous psychiatric history, who initially has been admitted to ICU secondary to intentional overdose by drinking antifreeze. At time of initial evaluation in emergency department patient reported that he was drinking 32 ounces of antifreeze, however, there were no significant electrolyte abnormalities or kidney failure noticed. Patient has been consulted by nephrology and it was recommended IV fluids and monitoring. Review of the patient's chart indicated that during this hospital stay patient became agitated, verbally abusive, aggressive towards the medical staff, was threatening to trista the hospital.  Patient was placed on two-point wrist restraints due to safety concern and to prevent injuries of patient's and medical staff. Patient has been seen in his room with presence of the one-to-one sitter. Patient was on 2 points wrist restraints. Immediately, after entering into the patient's room, he stated \"take me off of that and I am going home\". Patient was anxious, irritable and only partially cooperative with interview. Patient refused to go into the details of his current overdose, adamantly denied that he was drinking antifreeze with suicidal intentions, stated \"I wanted to make a statement to that bitch. I wanted her to have a heard attack\" (referred for his wife), \" if I wanted to kill myself I would be already dead\". However, during the interview, patient stated that he was drinking only a few sips of antifreeze, not as he initially stated 32 ounces. Patient denies any previous psychiatric history, denies any previous suicidal attempts, denies any previous admissions to psychiatric unit.   Patient denies history of using any illicit substances, however, when I informed the patient that his urinary drug screen was positive for amphetamine, he stated \"I guess I used meth once, just one day\", and again denied history of using any illicit substances in the past.    I discussed with patient possibility to transfer him to psychiatric unit, when he would be medically stable for transfer. Patient refused to be admitted to psychiatric unit, stated that he wants to be discharged home. In case, if patient would continue to refuse admission to psychiatric unit, I would strongly recommend to put patient on 72 hours hold. PSYCHIATRIC HISTORY:  Diagnoses: Denies  Suicide attempts/gestures: Denies   Prior hospitalizations: Denies   Medication trials: Denies   Mental health contact: Denies  Head trauma: Denies    Allergies:  Patient has no known allergies. Mental Status  Appearance: Poorly groomed, wearing hospital attire, on two-point service restraints. Made intermittent eye contact. Behavior: Anxious, partially cooperative with interview, mild psychomotor agitation appreciated. Gait was not evaluated, as patient was lying down on the bed on the restraints. Speech: Normal in tone, decrease in volume, no pressured speech noted. Mood: \"Fine\"   Affect: Mood congruent. Range is slightly intense  Thought Process: Mostly circumstantial, sometimes tangential  Thought Content: Patient does not have any current active suicidal and homicidal ideations. No overt delusions or paranoia appreciated. Perceptions: Seems patient does not have any auditory or visual hallucinations at present time. Patient did not appear to be responding to internal stimuli. Executive Functions: Appear impaired. Concentration: Poor  Reasoning: Appears impaired based on interaction from interview   Orientation: to person and situation. Alert. Language: Intact. Fund of information: Intact. Memory: recent and remote appear intact.    Impulsivity: Poor  Insight: Impaired  Judgment: Poor    Assessment  DSM 5 DIAGNOSIS:  Mood disorder, unspecified  Suicidal ideation  Methamphetamine abuse    Recommendations  1. Currently patient is not suicidal, homicidal or psychotic. However, considering seriousness of recent suicidal attempt, patient meets criteria for admission to psychiatric unit. 2.  Currently patient is on 2 points wrist restraints due to agitation and aggression towards medical staff. 3.  Recommended to put patient on chemical restraints instead of physical restraints, and administer Haldol 5 mg and lorazepam 2 mg IM once before transfer him to psychiatric unit.       Kirk Maki MD

## 2021-07-22 NOTE — DISCHARGE SUMMARY
Discharge Summary      Date:7/22/2021        Patient Name:John Alva     YOB: 1950     Age:70 y.o. Admit Date:7/20/2021   Admission Condition:serious   Discharged Condition:stable  Discharge Date: 07/22/21       Discharge Diagnoses   Active Problems:    Drug overdose, intentional, initial encounter [facility])  Resolved Problems:    * No resolved hospital problems. [facility] Stay   Narrative of Hospital Course:     71-year-old male with a history of Parkinson's disease, presented to the hospital 7/20/2021 after intentional suicide attempt by drinking about 32 ounces of antifreeze. Patient has been doing with a lot of depression at home, recent suicide by grandson, and some home dynamics. Patient was monitored in the ICU, was given fomepizole, followed in-house by nephrology, as well as poison control. Patient renal function has stayed stable, lactic acidosis has resolved. She was also noted to have amphetamine in urine. Patient has been noted to be agitated in house, aggressive with staff, trying to leave AMA, as well as requiring security and being put on restraints. Patient has had one-to-one sitter since admission in house, he was evaluated by psychiatry team and accepted for admission into inpatient psych unit. Patient was also cleared by poison control for transfer to psych unit. Physical Examination:  General: Well-developed, no acute distress lying comfortably in bed, in restraint. HEENT: Atraumatic normocephalic, range of motion normal   Cardiac: Normal S1-S2 no murmurs rub or gallop. Respiratory: clear To auscultation bilaterally, no rhonchi or rales, no wheezing  Abdomen: Soft, positive bowel sounds in all quadrants, no distention, nontender to palpation  Extremities: no tenderness, no edema, moves all extremities  Psych: Affect flat.       Consultants:   IP CONSULT TO NEPHROLOGY  IP CONSULT TO NEPHROLOGY  IP CONSULT TO PSYCHIATRY  IP CONSULT TO NEPHROLOGY    Time Spent on Discharge:  35 minutes were spent in patient examination, evaluation, counseling as well as medication reconciliation, prescriptions for required medications, discharge plan and follow up. Surgeries/Procedures Performed:  NONE      Significant Diagnostic Studies:   Recent Labs:  CBC:   Lab Results   Component Value Date    WBC 4.4 07/22/2021    RBC 4.21 07/22/2021    HGB 12.6 07/22/2021    HCT 38.5 07/22/2021    MCV 91.4 07/22/2021    MCH 29.9 07/22/2021    MCHC 32.7 07/22/2021    RDW 13.4 07/22/2021     07/22/2021     BMP:    Lab Results   Component Value Date    GLUCOSE 108 07/22/2021     07/22/2021    K 3.8 07/22/2021     07/22/2021    CO2 29 07/22/2021    ANIONGAP 7 07/22/2021    BUN 18 07/22/2021    CREATININE 0.9 07/22/2021    CALCIUM 8.9 07/22/2021    LABGLOM >60 07/22/2021    GFRAA >59 07/22/2021       Radiology Last 7 Days:  XR CHEST PORTABLE    Result Date: 7/20/2021  No acute findings. Signed by Dr Pao Wahl      Discharge Plan   Disposition: Discharge/Readmit    Provider Follow-Up:   No follow-up provider specified. Patient Instructions   Diet: regular diet    Activity: activity as tolerated      Discharge Medications         Medication List      You have not been prescribed any medications.          Electronically signed by Jennifer Dash MD on 7/22/21 at 1:28 PM CDT

## 2021-07-22 NOTE — PROGRESS NOTES
Patient refusing all morning medications. Patient has urinated in the floor and been verbally abusive towards staff members in the room.      Electronically signed by Saritha Shultz RN on 7/22/2021 at 10:15 AM

## 2021-07-22 NOTE — PROGRESS NOTES
[facility]  Admission Note     Admission Type: Involuntary       Reason for admission: Intentional overdose of antifreeze, pt told psychiatry prior to admission to [facility] that he did this not to self harm but to give his wife a heart attack. No prior psychiatric history. Pt verbally abusive and aggressive toward hospital staff       PATIENT STRENGTHS:KAI       Patient Strengths and Limitations:KAI       Addictive Behavior: KAI       Medical Problems:   Past Medical History:   Diagnosis Date    Parkinson disease ([address]        Status EXAM:       Tobacco Screening:  Practical Counseling, on admission, eliazar X, if applicable and completed (first 3 are required if patient doesn't refuse):            ( )  Recognizing danger situations (included triggers and roadblocks)                    ( )  Coping skills (new ways to manage stress, exercise, relaxation techniques, changing routine, distraction)                                                           ( )  Basic information about quitting (benefits of quitting, techniques in how to quit, available resources  ( ) Referral for counseling faxed to [facility]                                           ( x) Patient refused counseling  ( ) Patient has not smoked in the last 30 days    Metabolic Screening:    No results found for: LABA1C    No results found for: CHOL  No results found for: TRIG  No results found for: HDL  No components found for: LDLCAL  No results found for: LABVLDL      There is no height or weight on file to calculate BMI. BP Readings from Last 2 Encounters:   07/22/21 135/69       PT sedated after receiving Haldol IM on medical floor. Pt assisted to bed and is currently resting. Pt wearing hospital gown however pt is in line of sight of nurse's station at this time, will encourage pt to change into appropriate attire when he wakes up. Unable to complete full assessment due to medication effect at this time.  No contraband noted. Bed alarm set to prevent falls at this time. Will continue to monitor. Pt admitted with followings belongings:        Patient's home medications were NA. Patient oriented to surroundings and program expectations and copy of patient rights given. Received admission packet:  UNABLE AT THIS TIME DUE TO PT SEDATION. Consents reviewed, signed NOT AT THIS TIME DUE TO PATIENT SEDATION.  Refused                  Joanie Méndez RN

## 2021-07-23 PROBLEM — F34.9 PERSISTENT MOOD (AFFECTIVE) DISORDER, UNSPECIFIED (HCC): Status: ACTIVE | Noted: 2021-07-23

## 2021-07-23 LAB — ETHYLENE GLYCOL: 13 MG/DL

## 2021-07-23 PROCEDURE — 6370000000 HC RX 637 (ALT 250 FOR IP): Performed by: PSYCHIATRY & NEUROLOGY

## 2021-07-23 PROCEDURE — 99222 1ST HOSP IP/OBS MODERATE 55: CPT | Performed by: PSYCHIATRY & NEUROLOGY

## 2021-07-23 PROCEDURE — 1240000000 HC EMOTIONAL WELLNESS R&B

## 2021-07-23 RX ORDER — HYDROXYZINE HYDROCHLORIDE 10 MG/1
10 TABLET, FILM COATED ORAL 3 TIMES DAILY PRN
Status: DISCONTINUED | OUTPATIENT
Start: 2021-07-23 | End: 2021-07-26 | Stop reason: HOSPADM

## 2021-07-23 RX ORDER — TRAZODONE HYDROCHLORIDE 50 MG/1
25 TABLET ORAL NIGHTLY
Status: DISCONTINUED | OUTPATIENT
Start: 2021-07-23 | End: 2021-07-26 | Stop reason: HOSPADM

## 2021-07-23 RX ORDER — LANOLIN ALCOHOL/MO/W.PET/CERES
3 CREAM (GRAM) TOPICAL NIGHTLY
Status: DISCONTINUED | OUTPATIENT
Start: 2021-07-23 | End: 2021-07-26 | Stop reason: HOSPADM

## 2021-07-23 RX ADMIN — Medication 3 MG: at 20:49

## 2021-07-23 RX ADMIN — TRAZODONE HYDROCHLORIDE 25 MG: 50 TABLET ORAL at 20:49

## 2021-07-23 NOTE — PLAN OF CARE
Problem: Falls - Risk of:  Goal: Will remain free from falls  Description: Will remain free from falls  Outcome: Ongoing  Goal: Absence of physical injury  Description: Absence of physical injury  Outcome: Ongoing     Problem: Depressive Behavior With or Without Suicide Precautions:  Goal: Able to verbalize acceptance of life and situations over which he or she has no control  Description: Able to verbalize acceptance of life and situations over which he or she has no control  Outcome: Ongoing  Goal: Able to verbalize and/or display a decrease in depressive symptoms  Description: Able to verbalize and/or display a decrease in depressive symptoms  Outcome: Ongoing  Goal: Ability to disclose and discuss suicidal ideas will improve  Description: Ability to disclose and discuss suicidal ideas will improve  Outcome: Ongoing  Goal: Able to verbalize support systems  Description: Able to verbalize support systems  Outcome: Ongoing  Goal: Absence of self-harm  Description: Absence of self-harm  Outcome: Ongoing  Goal: Patient specific goal  Description: Patient specific goal  Outcome: Ongoing  Goal: Participates in care planning  Description: Participates in care planning  Outcome: Ongoing     Problem: Pain:  Goal: Pain level will decrease  Description: Pain level will decrease  Outcome: Ongoing  Goal: Control of acute pain  Description: Control of acute pain  Outcome: Ongoing  Goal: Control of chronic pain  Description: Control of chronic pain  Outcome: Ongoing

## 2021-07-23 NOTE — PLAN OF CARE
Problem: Non-Violent Restraints  Goal: Removal from restraints as soon as assessed to be safe  Outcome: Ongoing  Goal: No harm/injury to patient while restraints in use  Outcome: Ongoing  Goal: Patient's dignity will be maintained  Outcome: Ongoing     Problem: Falls - Risk of:  Goal: Will remain free from falls  Description: Will remain free from falls  Outcome: Ongoing  Goal: Absence of physical injury  Description: Absence of physical injury  Outcome: Ongoing     Problem: Depressive Behavior With or Without Suicide Precautions:  Goal: Able to verbalize acceptance of life and situations over which he or she has no control  Description: Able to verbalize acceptance of life and situations over which he or she has no control  7/23/2021 1631 by Gabriel Beavers RN  Outcome: Ongoing  7/23/2021 1032 by Dara Clark LPC  Outcome: Ongoing  Note:                                                                     Group Therapy Note    Date: 7/23/2021  Start Time: 0950  End Time:  1030  Number of Participants: 1    Type of Group: Psychotherapy    Patient's Goal: Process emotions and actions focusing on how to heal and improve relationships with others. Notes: Pt did not attend group as scheduled. SW attempted to invite pt to attend however pt was sleeping.       Discipline Responsible: /Counselor    Signature:  Dara Clark LPC    Goal: Able to verbalize and/or display a decrease in depressive symptoms  Description: Able to verbalize and/or display a decrease in depressive symptoms  Outcome: Ongoing  Goal: Ability to disclose and discuss suicidal ideas will improve  Description: Ability to disclose and discuss suicidal ideas will improve  Outcome: Ongoing  Goal: Able to verbalize support systems  Description: Able to verbalize support systems  Outcome: Ongoing  Goal: Absence of self-harm  Description: Absence of self-harm  Outcome: Ongoing  Goal: Patient specific goal  Description: Patient specific goal  Outcome: Ongoing  Goal: Participates in care planning  Description: Participates in care planning  Outcome: Ongoing     Problem: Pain:  Goal: Pain level will decrease  Description: Pain level will decrease  Outcome: Ongoing  Goal: Control of acute pain  Description: Control of acute pain  Outcome: Ongoing  Goal: Control of chronic pain  Description: Control of chronic pain  Outcome: Ongoing

## 2021-07-23 NOTE — PROGRESS NOTES
[facility] Daily Shift Assessment-Geriatric Unit  Nursing Progress Note          Room: [address] [place] [address]-   Name: Padma Mojica   Age: 72 y.o. Gender: male   Dx: <principal problem not specified>  Precautions: suicide risk, fall risk and elopement, assault  Inpatient Status: involuntary     SLEEP:    Sleep: Yes,   Sleep Quality Good   Hours Slept:    Sleep Medications: No  PRN Sleep Meds: No       MEDICAL:      Other PRN Meds: No  Med Compliant: N/A  Accu-Chek: No   Oxygen/CPAP/BiPAP: No  CIWA/CINA: No   PAIN Assessment: KAI  Side Effects from medication: none voiced    Is Patient experiencing any respiratory symptoms (headache, fever, body aches, cough. Dakota Phillips ): no  Patient educated by nursing to practice social distancing, wear masks, wash hands frequently: pt has been sleeping since shift change due to medication administered for agitation and aggression prior to arrival to our unit. Education to continue upon awakening. Metabolic Screening:    No results found for: LABA1C    No results found for: CHOL  No results found for: TRIG  No results found for: HDL  No components found for: LDLCAL  No results found for: LABVLDL      There is no height or weight on file to calculate BMI. BP Readings from Last 2 Encounters:   07/22/21 122/64   07/22/21 135/69         PSYCH:     SI KAI    HI KAI        AVH:KAI      Depression: KAI Anxiety: KAI       GENERAL:      Appetite: KAI  Social: KAI Speech: KAI   Appearance:disheveled  Assistive Devices: noneLevel of Assist: KAI      GROUP:    Group Participation: No  Participation LevelNone    Participation Pablo West    Notes: Pt has been largely KAI tonight due to sedation since arriving to the unit from the medical floor. Pt was administered Haldol 5mg IM and Ativan 2mg IM for aggressive behavior and agitation while on the medical floor.  Pt has been very lethargic and/or asleep since arrival. Pt VS stable upon arrival to our unit with BP at 122/64, HR at 66, Respirations at 16, O2 on RA at 95%, and Temp of 96.7. Pt has not been awakened to obtain any further VS at this time. Gait has not been assessed due to pt being in bed. Bed alarm activated for safety as well as pt has been positioned within eyesight of the nursing station. Will continue to monitor for behaviors and safety as ordered.      Electronically signed by Efren Mack RN on 7/22/2021 at 11:32 PM

## 2021-07-23 NOTE — PROGRESS NOTES
SW met with treatment team to discuss pt's progress and setbacks. Pt was admitted, 72 hour hold, suicidal attempt by intentionally drinking antifreeze, identifies several life stressors involving family members, denies HI/AVH. Since admission, pt was verbally abusive towards staff on medical floor, pt reportedly slept well last night, compliant with medications, unable to assess depression/anxiety, unable to assess SI/HI/AVH, continue current treatment plan.     Electronically signed by Deacon Samano, [address] on 7/23/2021 at 8:30 AM

## 2021-07-23 NOTE — PROGRESS NOTES
Pt awake at this time. Pt up and needing to use the restroom. Pt gait is unsteady and requires Nursing assist x1. Pt noted to be wearing an incontinence brief that tapes at the sides and was falling off of pt. Pt assisted to restroom without incident. No outbursts of aggression or agitation noted. Pt was calm and cooperative at this time, but unsteady on his feet. Pt also remarked \"I'm not woke up yet. \" Pt reassured that he did not have to wake up yet- that it was several hours until morning. No attempts made at this time to redress pt in unit appropriate clothing. Pt remains in his hospital gown due to continued medication effects and wanting to keep pt stimulation at a minimum. New brief given to pt which he did put on with assist from nursing at his bedside while sitting. No issues with this. No new VS obtained at this time either due to continued effects of medication and pt wanting to lay back down. Best efforts made to keep pt stimulation at a minimum at this time as to eliminate any unnecessary agitation. Pt did void a large amount of dark yellow/straw colored urine with a very strong, foul odor. Bed alarm reactivated at this time for pt safety. Will continue to monitor for safety.      Electronically signed by Jesenia Frazier RN on 7/22/2021 at 11:44 PM

## 2021-07-23 NOTE — PROGRESS NOTES
Behavioral Services  Medicare Certification Upon Admission    I certify that this patient's inpatient psychiatric hospital admission is medically necessary for:    [x] (1) Treatment which could reasonably be expected to improve this patient's condition,       [] (2) Or for diagnostic study;     AND     [x](2) The inpatient psychiatric services are provided while the individual is under the care of a physician and are included in the individualized plan of care.     Estimated length of stay/service 5 days to 7 weeks    Plan for post-hospital care TBA    Electronically signed by Meagan Delacruz MD on 7/23/2021 at 10:17 AM

## 2021-07-23 NOTE — H&P
Department of Psychiatry  Attending History and Physical        CHIEF COMPLAINT:  UTO    History obtained from: chart    HISTORY OF PRESENT ILLNESS:    72 y.o. male with no previous psychiatric history, admitted to ICU secondary to intentional overdose by drinking antifreeze. He was positive for amphetamine (?). At time of initial evaluation in emergency department patient reported that he was drinking 32 ounces of antifreeze, however, there were no significant electrolyte abnormalities or renal impairment. During this hospital stay patient became agitated, verbally abusive, aggressive towards the medical staff, was threatening to trista the hospital.  He required restraints. Patient was anxious, irritable and only partially cooperative with the interview yesterday. He adamantly denied that he was drinking antifreeze with the intent of hurting himself. \"I wanted to make a statement to that bitch. I wanted her to have a heart attack\" (referring to his wife), \" if I wanted to kill myself I would be already dead\". Patient was medicated for agitation and transferred to our unit. He has been sleeping since. His vital signs have been stable. He is sedated and unable to participate in the interview at this time. PSYCHIATRIC HISTORY, per chart:    Diagnoses: Denies  Suicide attempts/gestures: Denies   Prior hospitalizations: Denies   Medication trials: Denies   Mental health contact: Denies  Head trauma: Denies    SUBSTANCE USE HISTORY, per chart:  Patient denied illicit drug use, however, positive for amphetamine. Past Medical History:    Past Medical History:   Diagnosis Date    Parkinson disease [facility])        Past Surgical History:    Past Surgical History:   Procedure Laterality Date    BLADDER SURGERY      TURB       Medications Prior to Admission:   Prior to Admission medications    Not on File       Allergies:  Patient has no known allergies. Social History:  Lives with wife?     Family History: Family History   Family history unknown: Yes       REVIEW OF SYSTEMS:  UTO    PHYSICAL EXAM:  GENERAL APPEARANCE: 72y.o. year-old male in NAD   HEAD: Normocephalic, atraumatic. THROAT: No erythema, exudates, lesions. No tongue laceration. CARDIOVASCULAR: PMI nondisplaced. Regular rhythm and rate. Normal S1 and S2.  PULMONARY: Clear to auscultation bilaterally, no tenderness to palpation. ABDOMEN: Soft, nontender, nondistended. MUSCULOSKELTAL: No obvious deformities, clubbing, cyanosis or edema, no spinous process or paraspinous tenderness, normal ROM, distal pulses intact symmetric 2+ bilaterally. NEUROLOGICAL: No abnormal movements or tremors. SKIN: Warm, dry, intact, no rash, abrasions bruises     Vitals:  /64   Pulse 66   Temp 96.7 °F (35.9 °C) (Temporal)   Resp 16   SpO2 95%     Mental Status Examination:    Appearance: Stated age. Gait not assessed. No abnormal movements or tremor. Behavior: Sedated    DATA:  Lab Results   Component Value Date    WBC 4.4 (L) 07/22/2021    HGB 12.6 (L) 07/22/2021    HCT 37.7 (L) 07/22/2021    MCV 91.1 07/22/2021     07/22/2021     Lab Results   Component Value Date     07/22/2021    K 3.9 07/22/2021     07/22/2021    CO2 26 07/22/2021    BUN 17 07/22/2021    CREATININE 0.9 07/22/2021    GLUCOSE 98 07/22/2021    CALCIUM 8.7 (L) 07/22/2021    PROT 7.2 07/22/2021    LABALBU 3.7 07/22/2021    BILITOT 0.3 07/22/2021    ALKPHOS 89 07/22/2021    AST 14 07/22/2021    ALT 17 07/22/2021    LABGLOM >60 07/22/2021    GFRAA >59 07/22/2021       ASSESSMENT AND PLAN:  DSM-5 DIAGNOSIS:   Impression:  Mood disorder, unspecified  Suicidal ideation  R/o stimulant use  H/o Parkinson disease    Patient is meeting the criteria for inpatient psychiatric treatment. Plan:   -Admit to [facility] Unit and monitor on 15 minute checks. Suicide and fall precautions.  [place] reviewed.   -Gather collateral information from family with release.  -Medical monitoring to

## 2021-07-23 NOTE — PROGRESS NOTES
Met with pt to complete psychosocial and CSSR-S on this date. However, pt unable to assess due to altered mental status.      Electronically signed by Alden Up [facility] on 7/23/2021 at 9:49 AM

## 2021-07-23 NOTE — PROGRESS NOTES
Pt dressed in scrubs and disheveled most of shift. Unable to do interview with pt due to being sedated from medications received yesterday evening. Pt has not had any outburst, but refuses to answer questions at this time. Pt states \"I just want to get out of here and go home\". Explained policy and procedure of , then pt rolled over and went to sleep. Will continue to encourage pt to participate in treatment plan once pt is up and out of bed.  Pt did eat 50% of lunch and 50% of supper

## 2021-07-23 NOTE — PLAN OF CARE
Problem: Depressive Behavior With or Without Suicide Precautions:  Goal: Able to verbalize acceptance of life and situations over which he or she has no control  Description: Able to verbalize acceptance of life and situations over which he or she has no control  7/23/2021 1032 by Maggy Hurtado LPC  Outcome: Ongoing  Note:                                                                     Group Therapy Note    Date: 7/23/2021  Start Time: 0950  End Time:  1030  Number of Participants: 1    Type of Group: Psychotherapy    Patient's Goal: Process emotions and actions focusing on how to heal and improve relationships with others. Notes: Pt did not attend group as scheduled. SW attempted to invite pt to attend however pt was sleeping.       Discipline Responsible: /Counselor    Signature:  Maggy Hurtado [facility]

## 2021-07-23 NOTE — PROGRESS NOTES
Admission Note      Reason for admission/Target Symptom: Patient admitted to [facility] due to suicidal attempt by drinking antifreeze. Diagnoses: Ethylene glycol poisoning, intentional self-harm, initial encounter ([facility] [address] Suicide attempt ([facility] [address]   UDS: Positive for amphetamine  BAL:  Negative    SW met with treatment team to discuss patient's treatment including care planning, discharge planning, and follow-up needs. Pt has been admitted to [facility]. Treatment team has identified patient's discharge needs as medication management and outpatient therapy/counseling. Pt confirmed  the need for ongoing treatment post inpatient stay. Pt was also provided a handout of contact information for drug and alcohol treatment centers and other community support service such as [facility], AA, and [facility].     Electronically signed by Antoinette Acuna, [address] on 7/23/2021 at 8:53 AM

## 2021-07-23 NOTE — PROGRESS NOTES
Attempted to complete collateral. However, pt unable to assess due to altered mental status and no PHI signed in chart.      Electronically signed by Alden Up [facility] on 7/23/2021 at 9:50 AM

## 2021-07-24 PROCEDURE — 6370000000 HC RX 637 (ALT 250 FOR IP): Performed by: PSYCHIATRY & NEUROLOGY

## 2021-07-24 PROCEDURE — 1240000000 HC EMOTIONAL WELLNESS R&B

## 2021-07-24 PROCEDURE — 99231 SBSQ HOSP IP/OBS SF/LOW 25: CPT | Performed by: PSYCHIATRY & NEUROLOGY

## 2021-07-24 PROCEDURE — 99223 1ST HOSP IP/OBS HIGH 75: CPT | Performed by: PSYCHIATRY & NEUROLOGY

## 2021-07-24 RX ADMIN — ACETAMINOPHEN 650 MG: 325 TABLET ORAL at 20:55

## 2021-07-24 RX ADMIN — TRAZODONE HYDROCHLORIDE 25 MG: 50 TABLET ORAL at 20:55

## 2021-07-24 RX ADMIN — Medication 3 MG: at 20:55

## 2021-07-24 ASSESSMENT — ENCOUNTER SYMPTOMS
PHOTOPHOBIA: 0
VOMITING: 0
BACK PAIN: 0
SHORTNESS OF BREATH: 0
NAUSEA: 0
COUGH: 0

## 2021-07-24 ASSESSMENT — PAIN - FUNCTIONAL ASSESSMENT
PAIN_FUNCTIONAL_ASSESSMENT: ACTIVITIES ARE NOT PREVENTED
PAIN_FUNCTIONAL_ASSESSMENT: ACTIVITIES ARE NOT PREVENTED

## 2021-07-24 ASSESSMENT — PAIN DESCRIPTION - PROGRESSION
CLINICAL_PROGRESSION: RESOLVED
CLINICAL_PROGRESSION: RAPIDLY IMPROVING

## 2021-07-24 ASSESSMENT — PAIN DESCRIPTION - LOCATION
LOCATION: BACK
LOCATION: BACK

## 2021-07-24 ASSESSMENT — PAIN DESCRIPTION - ORIENTATION
ORIENTATION: MID
ORIENTATION: MID

## 2021-07-24 ASSESSMENT — PAIN DESCRIPTION - FREQUENCY
FREQUENCY: INTERMITTENT
FREQUENCY: INTERMITTENT

## 2021-07-24 ASSESSMENT — PAIN DESCRIPTION - PAIN TYPE
TYPE: ACUTE PAIN
TYPE: ACUTE PAIN

## 2021-07-24 ASSESSMENT — PAIN SCALES - GENERAL
PAINLEVEL_OUTOF10: 3
PAINLEVEL_OUTOF10: 0

## 2021-07-24 ASSESSMENT — PAIN DESCRIPTION - ONSET
ONSET: UNABLE TO TELL
ONSET: AWAKENED FROM SLEEP

## 2021-07-24 ASSESSMENT — PAIN DESCRIPTION - DESCRIPTORS
DESCRIPTORS: OTHER (COMMENT)
DESCRIPTORS: DISCOMFORT;DULL

## 2021-07-24 NOTE — PROGRESS NOTES
[facility] Daily Shift Assessment  Nursing Progress Note    Room: 0618/618-01 Name: Trista Butcher Age: 72 y.o. Gender: male   Dx: <principal problem not specified>  Precautions: suicide risk and fall risk  Target Symptoms:   Accu-Chek: NoSleep: Yes,Sleep Quality Good SI No AVH denies [facility] [place]  ADLs: No Speech: normal Depression: 0 Anxiety: 0   Participation LevelActive Listener  Appetite: Good  Respiratory symptoms: No Headache: No Body aches: No Fever: No Cough: No  Patients encouraged to wear masks, wash hands frequently and practice social distancing while on the unit: Yes  Visitation: No   Participation QualityResistant    Notes: Pt laying in bed dressed in scrubs and disheveled during interview. Pt states he slept well, but states he is ready to go home. Pt denies SI,HI and AVH this shift. Pt refused all groups and needs encouragement to get out of bed this shift. Pt constricted during interview and has no complaints this shift. Will continue to monitor pt. this shift.

## 2021-07-24 NOTE — CONSULTS
[address], [address]  [facility]  Phone [phone]     Neurology Consultation     Date of Admission: 2021  3:59 PM  Date of Consultation: 21    Attending Provider: Dixon Maria MD  Consulting Provider: Vern Dong M.D. Patient: Rosary Moritz  :  1950  Age:  72 y.o. MRN:  [id number]    CHIEF COMPLAINT:  Tremor    History Source: History obtained from the patient. PCP: No primary care provider on file. HISTORY OF PRESENT ILLNESS:   Rosary Moritz is a 72y.o. year old man who was admitted to the behavioral health unit  after a suicide attempt and I am asked to see him regarding a tremor. He says that he has had a tremor affecting his his hands and head for the past 15 years. He was told at one time that he had Parkinson's disease. He has been on medications for his tremor in the past but says they did not help. He does not remember which medications. He presented to the ER  after ingesting antifreeze in a suicide attempt. PAST MEDICAL HISTORY:    Medical History:      Diagnosis Date    Parkinson disease [facility])        Surgical History:      Procedure Laterality Date    BLADDER SURGERY      TURB       Medications Prior to Admission:    Prior to Admission medications    Not on File       Current Medications:  Current Facility-Administered Medications: traZODone (DESYREL) tablet 25 mg, 25 mg, Oral, Nightly  melatonin tablet 3 mg, 3 mg, Oral, Nightly  hydrOXYzine (ATARAX) tablet 10 mg, 10 mg, Oral, TID PRN  acetaminophen (TYLENOL) tablet 650 mg, 650 mg, Oral, Q4H PRN  polyethylene glycol (GLYCOLAX) packet 17 g, 17 g, Oral, Daily PRN  haloperidol lactate (HALDOL) injection 5 mg, 5 mg, Intramuscular, Q6H PRN    Allergies:  Patient has no known allergies. Habits:  TOBACCO:   reports that he has been smoking cigarettes. He has been smoking about 0.50 packs per day.  He has never used smokeless tobacco.  ETOH:   reports previous alcohol use. DRUGS:    Social History     Substance and Sexual Activity   Drug Use Not Currently       SOCIAL HISTORY:   Kamran Orozco is , lives in [facility], and is retired. FAMILY HISTORY:       Family history unknown: Yes       REVIEW OF SYSTEMS:  Review of Systems   Constitutional: Negative for chills and fever. HENT: Negative for hearing loss and tinnitus. Eyes: Negative for photophobia and visual disturbance. Respiratory: Negative for cough and shortness of breath. Cardiovascular: Negative for chest pain and palpitations. Gastrointestinal: Negative for nausea and vomiting. Endocrine: Negative for polydipsia and polyuria. Genitourinary: Negative for frequency and urgency. Musculoskeletal: Positive for arthralgias. Negative for back pain. Skin: Negative for rash and wound. Allergic/Immunologic: Negative for environmental allergies and food allergies. Neurological: Positive for tremors. Negative for dizziness, seizures, syncope, facial asymmetry, speech difficulty, weakness, light-headedness, numbness and headaches. Hematological: Negative for adenopathy. Does not bruise/bleed easily. Psychiatric/Behavioral: Positive for behavioral problems and dysphoric mood. The patient is nervous/anxious. PHYSICAL EXAMINATION:  Vitals: /72   Pulse 74   Temp 97.6 °F (36.4 °C) (Temporal)   Resp 18   SpO2 95%   General appearance: alert, appears stated age and cooperative  Skin: Skin color, texture, turgor normal. No rashes or lesions  HEENT: Head: Normal, normocephalic, atraumatic.   Neck:no adenopathy, no carotid bruit, no JVD, supple, symmetrical, trachea midline and thyroid not enlarged, symmetric, no tenderness/mass/nodules  Lungs: clear to auscultation bilaterally  Heart: regular rate and rhythm, S1, S2 normal, no murmur, click, rub or gallop  Abdomen: soft, non-tender; bowel sounds normal; no masses,  no organomegaly  Extremities: extremities normal, atraumatic, no cyanosis or edema      NEUROLOGIC EXAMINATION:  Neurologic Exam     Mental Status   Oriented to person, place, and time. Speech: speech is normal   Level of consciousness: alert  Speech is fluent. Cranial Nerves     CN II   Visual fields full to confrontation. CN III, IV, VI   Pupils are equal, round, and reactive to light. Extraocular motions are normal.     CN VII   Facial expression full, symmetric.      CN VIII   Hearing: intact    CN IX, X   Palate: symmetric    CN XI   Right sternocleidomastoid strength: normal  Left sternocleidomastoid strength: normal  Right trapezius strength: normal  Left trapezius strength: normal    CN XII   Tongue: not atrophic  Fasciculations: absent  Tongue deviation: none    Motor Exam   Muscle bulk: normal  Overall muscle tone: normal  Right arm pronator drift: absent  Left arm pronator drift: absent    Strength   Right neck flexion: 5/5  Left neck flexion: 5/5  Right neck extension: 5/5  Left neck extension: 5/5  Right deltoid: 5/5  Left deltoid: 5/5  Right biceps: 5/5  Left biceps: 5/5  Right triceps: 5/5  Left triceps: 5/5  Right wrist flexion: 5/5  Left wrist flexion: 5/5  Right wrist extension: 5/5  Left wrist extension: 5/5  Right interossei: 5/5  Left interossei: 5/5  Right iliopsoas: 5/5  Left iliopsoas: 5/5  Right quadriceps: 5/5  Left quadriceps: 5/5  Right glutei: 5/5  Left glutei: 5/5  Right anterior tibial: 5/5  Left anterior tibial: 5/5  Right posterior tibial: 5/5  Left posterior tibial: 5/5  Right peroneal: 5/5  Left peroneal: 5/5  Right gastroc: 5/5  Left gastroc: 5/5    Sensory Exam   Light touch normal.   Vibration normal.     Gait, Coordination, and Reflexes     Gait  Gait: normal    Coordination   Finger to nose coordination: normal    Tremor   Resting tremor: absent  Intention tremor: absent  Action tremor: left arm and right arm    Reflexes   Right brachioradialis: 1+  Left brachioradialis: 1+  Right biceps: 1+  Left biceps: 1+  Right triceps: 1+  Left triceps: 1+  Right patellar: 1+  Left patellar: 1+  Right achilles: 1+  Left achilles: 1+  Right plantar: normal  Left plantar: normal  Right Klein: absent  Left Klein: absent  Right ankle clonus: absent  Left ankle clonus: absent  Rapid head tremor present. Rapid alternating movements were normal.       ADDITIONAL REVIEW:  CBC:    Recent Labs     07/22/21  0656 07/22/21  0936 07/22/21  1341   WBC 3.9* 4.4* 4.4*   HGB 12.1* 12.6* 12.6*    175 199     BMP:     Recent Labs     07/22/21  0656 07/22/21  0936 07/22/21  1341    140 139   K 4.2 3.8 3.9    104 104   CO2 29 29 26   BUN 19 18 17   CREATININE 1.0 0.9 0.9   GLUCOSE 98 108 98     Hepatic:  Recent Labs     07/22/21  0656 07/22/21  0936 07/22/21  1341   AST 17 17 14   ALT 18 18 17   BILITOT 0.4 0.5 0.3   ALKPHOS 89 90 89       IMPRESSION:  1. Familial tremor. His tremor, family history, lack of parkinsonism on examination all indicate familial or essential tremor and not Parkinson's disease. He does not want a medication for this and says he would not take a medication if given one. 2. Depression with SI  3. Amphetamine abuse    PLAN:  1. Avoid medications that can aggravate tremor like Wellbutrin and Depakote.   2. Neuro FU MEGHAN    Belen Shah M.D.

## 2021-07-24 NOTE — PLAN OF CARE
Problem: Non-Violent Restraints  Goal: Removal from restraints as soon as assessed to be safe  7/23/2021 2213 by Ellen Solitario RN  Outcome: Met This Shift  7/23/2021 1631 by Chema Rangel RN  Outcome: Ongoing  Goal: No harm/injury to patient while restraints in use  7/23/2021 2213 by Ellen Solitario RN  Outcome: Met This Shift  7/23/2021 1631 by Chema Rangel RN  Outcome: Ongoing  Goal: Patient's dignity will be maintained  7/23/2021 2213 by Ellen Solitario RN  Outcome: Met This Shift  7/23/2021 1631 by Chema Rangel RN  Outcome: Ongoing     Problem: Falls - Risk of:  Goal: Will remain free from falls  Description: Will remain free from falls  7/23/2021 2213 by Ellen Soltiario RN  Outcome: Met This Shift  7/23/2021 1631 by Chema Rangel RN  Outcome: Ongoing  Goal: Absence of physical injury  Description: Absence of physical injury  7/23/2021 2213 by Ellen Solitario RN  Outcome: Met This Shift  7/23/2021 1631 by Chema Rangel RN  Outcome: Ongoing     Problem: Depressive Behavior With or Without Suicide Precautions:  Goal: Able to verbalize acceptance of life and situations over which he or she has no control  Description: Able to verbalize acceptance of life and situations over which he or she has no control  7/23/2021 2213 by Ellen Solitario RN  Outcome: Ongoing  7/23/2021 1631 by Chema Rangel RN  Outcome: Ongoing  7/23/2021 1032 by Bill Wilcox LPC  Outcome: Ongoing  Note:                                                                     Group Therapy Note    Date: 7/23/2021  Start Time: 0950  End Time:  1030  Number of Participants: 1    Type of Group: Psychotherapy    Patient's Goal: Process emotions and actions focusing on how to heal and improve relationships with others. Notes: Pt did not attend group as scheduled. SW attempted to invite pt to attend however pt was sleeping.       Discipline Responsible: /Counselor    Signature:  Bill Wilcox LPC    Goal: Able to verbalize and/or display a decrease in depressive symptoms  Description: Able to verbalize and/or display a decrease in depressive symptoms  7/23/2021 2213 by Evelyn Malik RN  Outcome: Ongoing  7/23/2021 1631 by Eddie Greene RN  Outcome: Ongoing  Goal: Ability to disclose and discuss suicidal ideas will improve  Description: Ability to disclose and discuss suicidal ideas will improve  7/23/2021 2213 by Evelyn Malik RN  Outcome: Ongoing  7/23/2021 1631 by Eddie Greene RN  Outcome: Ongoing  Goal: Able to verbalize support systems  Description: Able to verbalize support systems  7/23/2021 2213 by Evelyn Malik RN  Outcome: Ongoing  7/23/2021 1631 by Eddie Greene RN  Outcome: Ongoing  Goal: Absence of self-harm  Description: Absence of self-harm  7/23/2021 2213 by Evelyn Malik RN  Outcome: Met This Shift  7/23/2021 1631 by Eddie Greene RN  Outcome: Ongoing  Goal: Patient specific goal  Description: Patient specific goal  7/23/2021 2213 by Evelyn Malik RN  Outcome: Ongoing  7/23/2021 1631 by Eddie Greene RN  Outcome: Ongoing  Goal: Participates in care planning  Description: Participates in care planning  7/23/2021 2213 by Evelyn Malik RN  Outcome: Ongoing  7/23/2021 1631 by Eddie Greene RN  Outcome: Ongoing     Problem: Pain:  Goal: Pain level will decrease  Description: Pain level will decrease  7/23/2021 2213 by Evelyn Malik RN  Outcome: Met This Shift  7/23/2021 1631 by Eddie Greene RN  Outcome: Ongoing  Goal: Control of acute pain  Description: Control of acute pain  7/23/2021 2213 by Evelyn Malik RN  Outcome: Met This Shift  7/23/2021 1631 by Eddie Greene RN  Outcome: Ongoing  Goal: Control of chronic pain  Description: Control of chronic pain  7/23/2021 2213 by Evelyn Malik RN  Outcome: Met This Shift  7/23/2021 1631 by Eddie Greene RN  Outcome: Ongoing

## 2021-07-24 NOTE — PROGRESS NOTES
WRAP UP GROUP NOTE:     Patient's Goal:  Providing feedback as to their own progress in the care-plan provided. Pt's have an opportunity to explore self-reflective skills and share any additional cares and concerns not yet addressed. Pt effectively participated.      Energy level:LOW  Appetite:GOOD  Concentration: NORMAL  Hallucinations:GONE  Depression:NONE  Anxiety:NON3  How I worked today:BLANK  What helps me sleep:BLANK, PT STATED,\"i HAVE NO PROBLEM SLEEPING\"  Any questions/complaints/comments:NO

## 2021-07-24 NOTE — PROGRESS NOTES
person, place, date, and situation. Language: Intact. Fund of information: Intact. Memory: Recent and remote appear intact. Neurovegitative: Fair appetite and sleep. Insight: Limited. Judgment: Limited. Data  Lab Results   Component Value Date    WBC 4.4 (L) 07/22/2021    HGB 12.6 (L) 07/22/2021    HCT 37.7 (L) 07/22/2021    MCV 91.1 07/22/2021     07/22/2021      Lab Results   Component Value Date     07/22/2021    K 3.9 07/22/2021     07/22/2021    CO2 26 07/22/2021    BUN 17 07/22/2021    CREATININE 0.9 07/22/2021    GLUCOSE 98 07/22/2021    CALCIUM 8.7 (L) 07/22/2021    PROT 7.2 07/22/2021    LABALBU 3.7 07/22/2021    BILITOT 0.3 07/22/2021    ALKPHOS 89 07/22/2021    AST 14 07/22/2021    ALT 17 07/22/2021    LABGLOM >60 07/22/2021    GFRAA >59 07/22/2021       Medications    Current Facility-Administered Medications:     traZODone (DESYREL) tablet 25 mg, 25 mg, Oral, Nightly, Marti Pappas MD, 25 mg at 07/23/21 2049    melatonin tablet 3 mg, 3 mg, Oral, Nightly, Marti Pappas MD, 3 mg at 07/23/21 2049    hydrOXYzine (ATARAX) tablet 10 mg, 10 mg, Oral, TID PRN, Marti Pappas MD    acetaminophen (TYLENOL) tablet 650 mg, 650 mg, Oral, Q4H PRN, Katie Rollins MD    polyethylene glycol (GLYCOLAX) packet 17 g, 17 g, Oral, Daily PRN, Katie Rollins MD    haloperidol lactate (HALDOL) injection 5 mg, 5 mg, Intramuscular, Q6H PRN, Katie Rollins MD    ASSESSMENT AND PLAN  DSM 5 DIAGNOSIS  Impression  Mood disorder, unspecified  R/o dementia  Parkinson disease  Anemia, mild    Continue to observe. Obtain collateral from the wife. Plan:   1. Psychiatric Medications:   Continue current psychotropic medications as recommended. Monitor for side effects. The risks, benefits, side effects, indications, contraindications, alternatives and adverse effects of the medications have been discussed with patient. 2. Continue to provide supportive psychotherapy.   Encourage socialization and participation in recreational activities. Work on coping skills. 3. Medical Issues:    Continue medical monitoring by Dr. Andreea Luna and associates. Neurology consult. [facility] today. 4. Disposition:     to provide outpatient resources and facilitate disposition.      Amount of time spent with patient:      15 minutes with greater than 50 % of the time spent in counseling and collaboration of care

## 2021-07-24 NOTE — H&P
HISTORY and PHYSICAL      CHIEF COMPLAINT:  SI    Reason for Admission:  SI    History Obtained From:  Patient, chart    HISTORY OF PRESENT ILLNESS:      The patient is a 72 y.o. male who is admitted to the [place] [address] unit with worsening mood issues. He has no c/o CP or SOA. No abdominal pain or N/V. No dysuria. No new pain issues. No fevers. Past Medical History:        Diagnosis Date    Parkinson disease [facility])      Past Surgical History:        Procedure Laterality Date    BLADDER SURGERY      TURB         Medications Prior to Admission:    No medications prior to admission. Allergies:  Patient has no known allergies. Social History:   TOBACCO:   reports that he has been smoking cigarettes. He has been smoking about 0.50 packs per day. He has never used smokeless tobacco.  ETOH:   reports previous alcohol use. DRUGS:   reports previous drug use. MARITAL STATUS:    OCCUPATION:  Not working  Patient currently lives with patient      Family History:       Family history unknown: Yes     REVIEW OF SYSTEMS:  Constitutional: neg  CV: neg  Pulmonary: neg  GI: neg  : neg  Psych: SI  Neuro: neg  Skin: neg  MusculoSkeletal: neg  HEENT: neg  Joints: neg    Vitals:  /65   Pulse 72   Temp 97.2 °F (36.2 °C) (Temporal)   Resp 16   SpO2 92%     PHYSICAL EXAM:  Gen: NAD, alert, in bed  HEENT: WNL  Lymph: no LAD  Neck: no JVD or masses  Chest: CTA bilat  CV: RRR  Abdomen: NT/ND  Extrem: no C/C/E  Neuro: non focal  Skin: no rashes  Joints: no redness    DATA:  I have reviewed the admission labs and imaging tests.     ASSESSMENT AND PLAN:      Active Problems:    Suicidal ideations---follow with Psych    Amphetamine Use    PD        Mc Barney MD  12:00 AM 7/24/2021

## 2021-07-24 NOTE — PROGRESS NOTES
[facility] Daily Shift Assessment-Geriatric Unit  Nursing Progress Note          Room: [address] [place] [address]   Name: Parish Barrow   Age: 72 y.o. Gender: male   Dx: <principal problem not specified>  Precautions: close watch, suicide risk, fall risk and elopement, assault  Inpatient Status: involuntary     SLEEP:    Sleep: Yes,   Sleep Quality Fair   Hours Slept:    Sleep Medications: Yes and melatonin 3 mg, trazodone 25mg,  PRN Sleep Meds: No       MEDICAL:      Other PRN Meds: No   Med Compliant: Yes   Accu-Chek: No   Oxygen/CPAP/BiPAP: No  CIWA/CINA: No   PAIN Assessment: none  Side Effects from medication: No    Is Patient experiencing any respiratory symptoms (headache, fever, body aches, cough. [place] ): no  Patient educated by nursing to practice social distancing, wear masks, wash hands frequently: yes      Metabolic Screening:    No results found for: LABA1C    No results found for: CHOL  No results found for: TRIG  No results found for: HDL  No components found for: LDLCAL  No results found for: LABVLDL      There is no height or weight on file to calculate BMI. BP Readings from Last 2 Encounters:   07/23/21 112/65   07/22/21 135/69         PSYCH:     SI denies suicidal ideation    HI Negative for homicidal ideation        AVH:Absent      Depression: none Anxiety: none,  Pt is very constricted, minimal conversation with staff, more cooperative.        GENERAL:      Appetite: good  Social: No Speech: normal, pressured and hesitant   Appearance:appropriately dressed  Assistive Devices: noneLevel of Assist: Supervision      GROUP:    Group Participation: Yes  Participation LevelInteractive and Minimal    Participation QualityAttentive    Notes: pt in room dressed appropriate in casual attire, lying in bed, pt stated \"when can I go home\" pt had increase communication with staff this evening, pt affect flat, behavior constricted, pt would not answer to SI,HI and AVH, pt denies depression and anxiety with incongruent affect, pt participated with staff with wrap up questions, pt no adl's, pt appetite normal for patient, pt gait weak, tremors r/t Parkinson's. Pt wears brief, pt in bed most of shift, pt close to nurse station and in eye view for safety, bed alarm on bed for patient safety, encouraged pt to increase fluid intake, will continue to monitor for safety.     Electronically signed by Christel Arriaga RN on 7/23/21 at 11:13 PM CDT

## 2021-07-25 PROCEDURE — 6370000000 HC RX 637 (ALT 250 FOR IP): Performed by: PSYCHIATRY & NEUROLOGY

## 2021-07-25 PROCEDURE — 1240000000 HC EMOTIONAL WELLNESS R&B

## 2021-07-25 RX ADMIN — Medication 3 MG: at 21:43

## 2021-07-25 RX ADMIN — TRAZODONE HYDROCHLORIDE 25 MG: 50 TABLET ORAL at 21:43

## 2021-07-25 NOTE — PROGRESS NOTES
Progress Note  Reola Holstein  7/24/2021 10:22 PM  Subjective:   Admit Date:   7/22/2021      CC/ADMIT DX:       Interval History:   Reviewed overnight events and nursing notes. He has no new medical issues. I have reviewed all labs/diagnostics from the last 24hrs. ROS:   I have done a 10 point ROS and all are negative, except what is mentioned in the HPI. ADULT DIET; Regular    Medications:      traZODone  25 mg Oral Nightly    melatonin  3 mg Oral Nightly           Objective:   Vitals: /65   Pulse 72   Temp 98.1 °F (36.7 °C) (Temporal)   Resp 18   SpO2 95%  No intake or output data in the 24 hours ending 07/24/21 2222  General appearance: alert and cooperative with exam  Extremities: extremities normal, atraumatic, no cyanosis or edema  Neurologic:  No obvious focal neurologic deficits. Skin: no rashes    Assessment and Plan: Active Problems:    Suicidal ideations    Persistent mood (affective) disorder, unspecified ([facility] [address]  Resolved Problems:    * No resolved hospital problems. *      Plan:  1. Continue present medication(s)   2. Follow with Psych  3. He is medically stable. I will monitor for any changes or concerns. Discharge planning:    home     Reviewed treatment plans with the patient and/or family.              Electronically signed by Arnold Manriquez MD on 7/24/2021 at 10:22 PM

## 2021-07-25 NOTE — PLAN OF CARE
Problem: Non-Violent Restraints  Goal: Removal from restraints as soon as assessed to be safe  7/25/2021 0238 by Lucia Bates RN  Outcome: Met This Shift  7/24/2021 1351 by Mya Cornejo RN  Outcome: Ongoing  Goal: No harm/injury to patient while restraints in use  7/25/2021 0238 by Lucia Bates RN  Outcome: Met This Shift  7/24/2021 1351 by Mya Cornejo RN  Outcome: Ongoing  Goal: Patient's dignity will be maintained  7/25/2021 0238 by Lucia Bates RN  Outcome: Met This Shift  7/24/2021 1351 by Mya Cornejo RN  Outcome: Ongoing     Problem: Falls - Risk of:  Goal: Will remain free from falls  Description: Will remain free from falls  7/25/2021 0238 by Lucia Bates RN  Outcome: Met This Shift  7/24/2021 1351 by Mya Cornejo RN  Outcome: Ongoing  Goal: Absence of physical injury  Description: Absence of physical injury  7/25/2021 0238 by Lucia Bates RN  Outcome: Met This Shift  7/24/2021 1351 by Mya Cornejo RN  Outcome: Ongoing

## 2021-07-25 NOTE — PROGRESS NOTES
Progress Note  Brent Hsieh  7/25/2021 12:46 PM  Subjective:   Admit Date:   7/22/2021      CC/ADMIT DX:       Interval History:   Reviewed overnight events and nursing notes. He has no new medical issues. I have reviewed all labs/diagnostics from the last 24hrs. ROS:   I have done a 10 point ROS and all are negative, except what is mentioned in the HPI. ADULT DIET; Regular    Medications:      traZODone  25 mg Oral Nightly    melatonin  3 mg Oral Nightly           Objective:   Vitals: /64   Pulse 72   Temp 97.4 °F (36.3 °C) (Temporal)   Resp 16   SpO2 96%  No intake or output data in the 24 hours ending 07/25/21 1246  General appearance: alert and cooperative with exam  Extremities: extremities normal, atraumatic, no cyanosis or edema  Neurologic:  No obvious focal neurologic deficits. Skin: no rashes    Assessment and Plan: Active Problems:    Suicidal ideations    Persistent mood (affective) disorder, unspecified ([facility] [address]  Resolved Problems:    * No resolved hospital problems. *      Plan:  1. Continue present medication(s)   2. Follow with Psych  3. He remains medically stable. I will monitor for any changes or concerns. Discharge planning:    home     Reviewed treatment plans with the patient and/or family.              Electronically signed by Domonique Avina MD on 7/25/2021 at 12:46 PM

## 2021-07-25 NOTE — PROGRESS NOTES
[facility] Daily Shift Assessment-Geriatric Unit  Nursing Progress Note          Room: [address] [place] [address]-   Name: Sandra Wilcox   Age: 72 y.o. Gender: male   Dx: <principal problem not specified>  Precautions: suicide risk ,RTF ,assault precautions,elopement precautions  Inpatient Status: involuntary     SLEEP:    Sleep: Yes,   Sleep Quality:poor    Hours Slept: decreased   Sleep Medications: Yes tarzodone 25 mg melatonin 3mg  PRN Sleep Meds: No       MEDICAL:    Tylenol 650 mg  Other PRN Meds: Yes   Med Compliant: No   Accu-Chek: No   Oxygen/CPAP/BiPAP: No  CIWA/CINA: No   PAIN Assessment: present - adequately treated  Side Effects from medication: No    Is Patient experiencing any respiratory symptoms (headache, fever, body aches, cough. [place] ): no  Patient educated by nursing to practice social distancing, wear masks, wash hands frequently: yes      Metabolic Screening:    No results found for: LABA1C    No results found for: CHOL  No results found for: TRIG  No results found for: HDL  No components found for: LDLCAL  No results found for: LABVLDL      There is no height or weight on file to calculate BMI. BP Readings from Last 2 Encounters:   07/24/21 121/65   07/22/21 135/69         PSYCH:     SI denies suicidal ideation    HI Negative for homicidal ideation        AVH:Absent      Depression: 0 Anxiety: 0       GENERAL:      Appetite: no change from normal  Social: No Speech: hesitant   Appearance:appropriately dressed, disheveled and healthy looking  Assistive Devices: noneLevel of Assist: Independent      GROUP:    Group Participation: No  Participation LevelNone    Participation QualityResistant    Notes: Pt in bed during this shift and during this interview. He is cooperative , irritable,pt asks me to repeat questions multiple times and tells me he is hard of hearing. Pt says he wants to go home,that he did not drink enough antifreeze to kill himself. I ask pt if he realized the result drinking antifreeze could have on his body. Pt reports he does not. Educated pt on results of drinking antifreeze,including organ failure,injury to the brain,liver,blood vessels and even death. Pt was not surprised. Pt as compliant with meds. Will continue to monitor for safety.

## 2021-07-25 NOTE — PLAN OF CARE
support systems  7/25/2021 1003 by Bobby Turk RN  Outcome: Ongoing  7/25/2021 0238 by Jaylene Hawkins RN  Outcome: Ongoing  Goal: Absence of self-harm  Description: Absence of self-harm  7/25/2021 1003 by Bobby Turk RN  Outcome: Ongoing  7/25/2021 0238 by Jaylene Hawkins RN  Outcome: Ongoing  Goal: Patient specific goal  Description: Patient specific goal  7/25/2021 1003 by Bobby Turk RN  Outcome: Ongoing  7/25/2021 0238 by Jaylene Hawkins RN  Outcome: Ongoing  Goal: Participates in care planning  Description: Participates in care planning  7/25/2021 1003 by Bobby Turk RN  Outcome: Ongoing  7/25/2021 0238 by Jaylene Hawkins RN  Outcome: Ongoing     Problem: Pain:  Goal: Pain level will decrease  Description: Pain level will decrease  7/25/2021 1003 by Bobby Turk RN  Outcome: Ongoing  7/25/2021 0238 by Jaylene Hawkins RN  Outcome: Ongoing  Goal: Control of acute pain  Description: Control of acute pain  7/25/2021 1003 by Bobby Turk RN  Outcome: Ongoing  7/25/2021 0238 by Jaylene Hawkins RN  Outcome: Ongoing  Goal: Control of chronic pain  Description: Control of chronic pain  7/25/2021 1003 by Bobby Turk RN  Outcome: Ongoing  7/25/2021 0238 by Jaylene Hawkins RN  Outcome: Ongoing

## 2021-07-25 NOTE — PROGRESS NOTES
[facility] Daily Shift Assessment  Nursing Progress Note    Room: 0618/618-01 Name: Mary Mancilla Age: 72 y.o. Gender: male   Dx: <principal problem not specified>  Precautions: suicide risk and fall risk  Target Symptoms:   Accu-Chek: NoSleep: Yes,Sleep Quality Good SI No AVH denies [facility]  ADLs: Yes Speech: normal Depression: 0 Anxiety: 0   Participation LevelMinimal  Appetite: Good  Respiratory symptoms: No Headache: No Body aches: No Fever: No Cough: No  Patients encouraged to wear masks, wash hands frequently and practice social distancing while on the unit: Yes  Visitation: No   Participation QualityAppropriate    Notes: Pt laying in bed during interview. Pt states \"I don't want to talk to you right now\". Pt educated on policies and procedures of [facility] Unit. Pt denies SI,HI and AVH this shift. Will continue to monitor pt this shift.

## 2021-07-26 VITALS
RESPIRATION RATE: 16 BRPM | DIASTOLIC BLOOD PRESSURE: 72 MMHG | TEMPERATURE: 98 F | SYSTOLIC BLOOD PRESSURE: 102 MMHG | HEART RATE: 72 BPM | OXYGEN SATURATION: 98 %

## 2021-07-26 PROBLEM — R45.851 SUICIDAL IDEATIONS: Status: RESOLVED | Noted: 2021-07-22 | Resolved: 2021-07-26

## 2021-07-26 PROBLEM — F15.10 STIMULANT ABUSE (HCC): Chronic | Status: ACTIVE | Noted: 2021-07-26

## 2021-07-26 PROBLEM — R41.89 COGNITIVE IMPAIRMENT: Chronic | Status: ACTIVE | Noted: 2021-07-26

## 2021-07-26 PROBLEM — G25.0 FAMILIAL TREMOR: Chronic | Status: ACTIVE | Noted: 2021-07-26

## 2021-07-26 PROBLEM — T50.902A DRUG OVERDOSE, INTENTIONAL, INITIAL ENCOUNTER (HCC): Status: RESOLVED | Noted: 2021-07-20 | Resolved: 2021-07-26

## 2021-07-26 PROCEDURE — 5130000000 HC BRIDGE APPOINTMENT

## 2021-07-26 PROCEDURE — 99239 HOSP IP/OBS DSCHRG MGMT >30: CPT | Performed by: PSYCHIATRY & NEUROLOGY

## 2021-07-26 NOTE — DISCHARGE INSTR - DIET

## 2021-07-26 NOTE — PROGRESS NOTES
Met with pt to complete psychosocial and CSSR-S on this date. However, pt declined/refused.      Electronically signed by John Anthony, [address] on 7/26/2021 at 10:04 AM

## 2021-07-26 NOTE — DISCHARGE SUMMARY
Discharge Summary     Patient ID:  Chey Rivas  [id number]  47 y.o.  1950    Admit date: 7/22/2021  Discharge date: 7/26/2021    Admitting Physician: Jaun Staples MD   Attending Physician: Wale Szymanski MD  Discharge Provider: Wale Szymanski MD     Admission Diagnoses:   Mood disorder, unspecified  Suicidal ideation   S/p Suicide attempt  R/o Stimulant use  H/o Parkinson disease    Discharge Diagnoses:   Mood disorder, unspecified  Cognitive impairment  Methamphetamine use disorder  Familial tremor  Anemia, mild    Admission Condition: poor    Discharged Condition: stable    Indication for Admission: Suicide attempt    CHIEF COMPLAINT:  UTO     History obtained from: chart     HISTORY OF PRESENT ILLNESS:    72 y. o. male with no previous psychiatric history, admitted to ICU secondary to intentional overdose by drinking antifreeze.  He was positive for amphetamine (?). At time of initial evaluation in emergency department patient reported that he was drinking 32 ounces of antifreeze, however, there were no significant electrolyte abnormalities or renal impairment. During this hospital stay patient became agitated, verbally abusive, aggressive towards the medical staff, was threatening to trista the hospital.  He required restraints. Patient was anxious, irritable and only partially cooperative with the interview yesterday. He adamantly denied that he was drinking antifreeze with the intent of hurting himself. \"I wanted to make a statement to that bitch. I wanted her to have a heart attack\" (referring to his wife), \" if I wanted to kill myself I would be already dead\".   Patient was medicated for agitation and transferred to our unit. He has been sleeping since. His vital signs have been stable.   He is sedated and unable to participate in the interview at this time.     PSYCHIATRIC HISTORY, per chart:    Diagnoses: Denies  Suicide attempts/gestures: Denies   Prior hospitalizations: Denies   Medication trials: Denies   Mental health contact: Denies  Head trauma: Denies     SUBSTANCE USE HISTORY, per chart:  Patient denied illicit drug use, however, positive for amphetamine. Hospital Course:  Patient was admitted to the behavioral health floor and was acclimated to the unit. He was placed on suicide and fall precautions. Labs were reviewed and physical exam was completed by Dr. Angella Almeida and associates. Home medications were reconciled. RHODA was obtained and reviewed. Patient refused to take medications while on the unit. He refused lab draws. Patient did poorly on the cognitive testing, and cognitive impairment was suspected. Patient was seen by Neurology for tremor. 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 As needed follow-up was recommended. This was discussed with patient. Patient was initially irritable, however, there was no evidence of agitation or aggression. With treatment, his mood improved and affect brightened. All interactions with the peers and staff members were appropriate. Behaviorally, he was not a problem. Sleep and appetite improved. With the above-mentioned medications changes as well as psychotherapeutic interventions, patient reported considerable improvement in his condition and requested discharge. It was felt that patient was at his baseline. Patient has no access to guns at home. Patient's wife reported that patient has been using stimulants and has been aggressive with the family at times. It was felt that his symptoms were related to drug use given that no aggression was observed on our unit. He denied substance use or the need for chemical dependency treatment. Family agreed to take patient back home. On 7/26/2021 it was therefore decided to discharge the patient, as it was felt that he received maximal benefit from his hospitalization. This patient is not suicidal, homicidal or psychotic at discharge.  He does not present danger to self or others. Number of antipsychotic medication prescribed at discharge: 0 - patient refused medications  IF MORE THAN ONE IS USED: NA    History of greater than 3 failed multiple monotherapy trials: NA  Monotherapy taper plan/ cross taper in progress: NA  Augmentation of Clozapine: NA    Referral to addiction treatment: patient refused    Prescription for Alcohol or Drug Disorder Medication: patient refused    Prescription for Tobacco Cessation medication: none    If no prescriptions for Tobacco Cessation must document why: patient refused    Consults: Internal medicine    Significant Diagnostic Studies:   Lab Results   Component Value Date    WBC 4.4 (L) 07/22/2021    HGB 12.6 (L) 07/22/2021    HCT 37.7 (L) 07/22/2021    MCV 91.1 07/22/2021     07/22/2021     Lab Results   Component Value Date     07/22/2021    K 3.9 07/22/2021     07/22/2021    CO2 26 07/22/2021    BUN 17 07/22/2021    CREATININE 0.9 07/22/2021    GLUCOSE 98 07/22/2021    CALCIUM 8.7 (L) 07/22/2021    PROT 7.2 07/22/2021    LABALBU 3.7 07/22/2021    BILITOT 0.3 07/22/2021    ALKPHOS 89 07/22/2021    AST 14 07/22/2021    ALT 17 07/22/2021    LABGLOM >60 07/22/2021    GFRAA >59 07/22/2021         No results found for: AMHTFQRI28  No results found for: VITD25  No results found for: CHOL  No results found for: TRIG  No results found for: HDL  No results found for: LDLCHOLESTEROL, LDLCALC  No results found for: LABVLDL, VLDL  No results found for: CHOLHDLRATIO  No results found for: LABA1C  No results found for: EAG  No results found for: TSHFT4, TSH    Treatments: RN and SW    Mental status examination at the time of discharge:  Alert, Oriented X 4  Appearance:  Improved Hygiene, smiling, BL hand tremor present  Speech with Regular Rate and Rhythm  Eye Contact:  Good  Mild Psychomotor Retardation Noted  Attitude:  Cooperative  Mood:  \"Good. I wan to go home. \"  Affective: Congruent  Thought Processes:  Coherently communicated, logical and goal oriented  Thought Content:  No Suicidal Ideation, No Homicidal Ideation, No Auditory or Visual Hallucinations, NO Overt Delusions  Insight: Improved  Judgement: Improved  Memory is intact for both remote and recent  Intellectual Functionin [address] of Knowledge:  Adequate  Attention and Concentration:  Adequate    Discharge Exam:  Please, see medical note    Disposition: home    Patient Instructions: There are no discharge medications for this patient.       Activity: as tolerated  Diet: regular diet  Wound Care: none needed    Follow-up:   Go to [facility]  Open access walk in clinic for intake/therapy/med management on Mon-Fri from 8:30 am to 4 pm. Please bring photo ID, social security card, insurance card, and an updated med list.  Rimma Roque [address] [place] [address] Drive   [phone]   Fax- [phone]          Follow up with PCP in 2 week(s)  on follow up with PCP, please review labs/imaging done during this Hospital stay, and discuss any additional/repeat testing or treatment needed with your PCP      Call [facility]) Neurology as needed     Time worked: 33 min    Participation: good    Electronically signed by Cody Kang MD on 2021 at 10:39 AM

## 2021-07-26 NOTE — PROGRESS NOTES
Discharge Note    Pt discharging on this date. Pt denies SI, HI, and AVH at this time. Pt reports improvement in behavior and is leaving unit in overall good condition. SW and pt discussed pt's follow up appointments and importance of attending appointments as scheduled, pt voiced understanding and agreement. Pt able to verbally identify: warning signs, coping strategies, places and people that help make the pt feel better/distract negative thoughts, friends/family/agencies/professionals the pt can reach out to in a crisis, and something that is important to the pt/worth living for. Pt provided the national suicide prevention hotline number (7-237-440-9116) as well as local community behavioral health [facility]) crisis number for emergencies (8-041-292-542-758-3691). Pt to follow up with [facility] for an open access walk in clinic for intake/therapy/med management on Mon-Fri from 8:30 am to 4 pm. Pt can walk in anytime between those days/hours. SW offered to assist pt with transportation, pt reports having transportation. Referral to out patient tobacco cessation counseling treatment: Patient refused tobacco cessation counseling.      Electronically signed by Arsenio Koch on 7/26/2021 at 9:54 AM

## 2021-07-26 NOTE — PLAN OF CARE
Group Therapy Note    Date: 7/26/2021  Start Time: 1000  End Time:  1030  Number of Participants: 3    Type of Group: Cognitive Skills    Wellness Binder Information  Module Name:  Men's Issues  Session Number:  1    Group Goal for Pt: To improve knowledge of effective stress management techniques    Notes:  Pt demonstrated improved knowledge of effective stress management techniques by actively participating in group discussion. Status After Intervention:  Unchanged    Participation Level:  Active Listener and Interactive    Participation Quality: Appropriate and Attentive      Speech:  normal      Thought Process/Content: Logical      Affective Functioning: Congruent      Mood: anxious and depressed      Level of consciousness:  Alert and Oriented x4      Response to Learning: Able to verbalize current knowledge/experience, Able to verbalize/acknowledge new learning, and Progressing to goal      Endings: None Reported    Modes of Intervention: Education      Discipline Responsible: Psychoeducational Specialist      Signature:  Edgar Arechiga

## 2021-07-26 NOTE — PLAN OF CARE
Problem: Non-Violent Restraints  Goal: Removal from restraints as soon as assessed to be safe  7/26/2021 0244 by Sung Gore RN  Outcome: Completed  Goal: No harm/injury to patient while restraints in use  7/26/2021 0244 by Sung Gore RN  Outcome: Completed  Goal: Patient's dignity will be maintained  7/26/2021 0244 by Sung Gore RN  Outcome: Completed     Problem: Falls - Risk of:  Goal: Will remain free from falls  Description: Will remain free from falls  7/26/2021 0828 by Anne Oviedo RN  Outcome: Completed  7/26/2021 0247 by Sung Gore RN  Outcome: Met This Shift  7/26/2021 0244 by Sung Gore RN  Outcome: Met This Shift  Goal: Absence of physical injury  Description: Absence of physical injury  7/26/2021 0828 by Anne Oviedo RN  Outcome: Completed  7/26/2021 0247 by Sung Gore RN  Outcome: Met This Shift  7/26/2021 0244 by Sung Gore RN  Outcome: Met This Shift     Problem: Depressive Behavior With or Without Suicide Precautions:  Goal: Able to verbalize acceptance of life and situations over which he or she has no control  Description: Able to verbalize acceptance of life and situations over which he or she has no control  7/26/2021 0828 by Anne Oviedo RN  Outcome: Completed  7/26/2021 0247 by Sung Gore RN  Outcome: Ongoing  7/26/2021 0244 by Sung Gore RN  Outcome: Ongoing  Goal: Able to verbalize and/or display a decrease in depressive symptoms  Description: Able to verbalize and/or display a decrease in depressive symptoms  7/26/2021 0828 by Anne Oviedo RN  Outcome: Completed  7/26/2021 0247 by Sung Gore RN  Outcome: Ongoing  7/26/2021 0244 by Sung Gore RN  Outcome: Ongoing  Goal: Ability to disclose and discuss suicidal ideas will improve  Description: Ability to disclose and discuss suicidal ideas will improve  7/26/2021 0828 by Anne Oviedo RN  Outcome: Completed  7/26/2021 0247 by Sung Gore RN  Outcome: Ongoing  7/26/2021 0244 by Sung Gore RN  Outcome: Ongoing  Goal: Able to verbalize support systems  Description: Able to verbalize support systems  7/26/2021 0828 by Destinee Glover RN  Outcome: Completed  7/26/2021 0247 by Georgiana Black RN  Outcome: Ongoing  7/26/2021 0244 by Georgiana Black RN  Outcome: Ongoing  Goal: Absence of self-harm  Description: Absence of self-harm  7/26/2021 0828 by Destinee Glover RN  Outcome: Completed  7/26/2021 0247 by Georgiana Black RN  Outcome: Met This Shift  7/26/2021 0244 by Georgiana Black RN  Outcome: Met This Shift  Goal: Patient specific goal  Description: Patient specific goal  7/26/2021 0828 by Destinee Glover RN  Outcome: Completed  7/26/2021 0247 by Georgiana Black RN  Outcome: Ongoing  7/26/2021 0244 by Georgiana Black RN  Outcome: Ongoing  Goal: Participates in care planning  Description: Participates in care planning  7/26/2021 0828 by Destinee Glover RN  Outcome: Completed  7/26/2021 0247 by Georgiana Black RN  Outcome: Ongoing  7/26/2021 0244 by Georgiana Black RN  Outcome: Ongoing     Problem: Pain:  Description: Pain management should include both nonpharmacologic and pharmacologic interventions.   Goal: Pain level will decrease  Description: Pain level will decrease  7/26/2021 0828 by Destinee Glover RN  Outcome: Completed  7/26/2021 0247 by Georgiana Black RN  Outcome: Ongoing  7/26/2021 0244 by Georgiana Black RN  Outcome: Ongoing  Goal: Control of acute pain  Description: Control of acute pain  7/26/2021 0828 by Destinee Glover RN  Outcome: Completed  7/26/2021 0247 by Georgiana Black RN  Outcome: Ongoing  7/26/2021 0244 by Georgiana Black RN  Outcome: Ongoing  Goal: Control of chronic pain  Description: Control of chronic pain  7/26/2021 0828 by Destinee Glover RN  Outcome: Completed  7/26/2021 0247 by Georgiana Black RN  Outcome: Ongoing  7/26/2021 0244 by Georgiana Black RN  Outcome: Ongoing

## 2021-07-26 NOTE — PROGRESS NOTES
[facility] Daily Shift Assessment-Geriatric Unit  Nursing Progress Note          Room: [address] [place] [address]   Name: Anthonette Mcardle   Age: 72 y.o. Gender: male   Dx: <principal problem not specified>  Precautions: close watch, suicide risk and fall risk  Inpatient Status: involuntary     SLEEP:    Sleep: Yes,   Sleep Quality Fair   Hours Slept: 7   Sleep Medications: Yes  PRN Sleep Meds: No       MEDICAL:      Other PRN Meds: No   Med Compliant: Yes   Accu-Chek: No   Oxygen/CPAP/BiPAP: Yes  CIWA/CINA: No   PAIN Assessment: none  Side Effects from medication: No    Is Patient experiencing any respiratory symptoms (headache, fever, body aches, cough. Erlinda Juvencio ): no  Patient educated by nursing to practice social distancing, wear masks, wash hands frequently: yes      Metabolic Screening:    No results found for: LABA1C    No results found for: CHOL  No results found for: TRIG  No results found for: HDL  No components found for: LDLCAL  No results found for: LABVLDL      There is no height or weight on file to calculate BMI. BP Readings from Last 2 Encounters:   07/25/21 134/71   07/22/21 135/69         PSYCH:     SI denies suicidal ideation    HI Negative for homicidal ideation        AVH:Absent      Depression: 0 Anxiety: 0       GENERAL:      Appetite: good  Social: No Speech: normal   Appearance:appropriately dressed and appropriately groomed  Assistive Devices: noneLevel of Assist: Independent      GROUP:    Group Participation: No  Participation LevelNone    Participation QualityResistant    Notes:   Patient was out of room some tonight. Patient asked to come out to the day area and to do wrap-up group, patient refused and stayed in his room in bed resting. Patient took medication without problems,  Was calm and pleasant during the shift, and is resting in bed at this time with eyes closed. Patient denies respiratory issues at this time, will continue monitoring for any changes.   Continuing to monitor for

## 2021-07-26 NOTE — PROGRESS NOTES
[facility] Adult Unit Daily Assessment  Nursing Progress Note    Room: [facility]618-01   Name: Padma Mojica   Age: 72 y.o. Gender: male   Dx: <principal problem not specified>  Precautions: suicide risk, fall risk and assault precautions, elopement precautions  Inpatient Status: involuntary       SLEEP:    Sleep Quality Good  Sleep Medications: Yes   PRN Sleep Meds: No       MEDICAL:    Other PRN Meds: Yes   Med Compliant: Yes  Accu-Chek: No  Oxygen/CPAP/BiPAP: No  CIWA/CINA: No   PAIN Assessment: none  Side Effects from medication: No    Is Patient experiencing any respiratory symptoms (headache, fever, body aches, cough. Dakota Phillips ): no  Patient educated by nursing to practice social distancing, wear masks, wash hands frequently: yes      PSYCH:    Depression: 0   Anxiety: 0   SI denies suicidal ideation   HI Negative for homicidal ideation      AVH:Absent      GENERAL:    Appetite: good    Social: No   Speech: normal   Appearance: appropriately dressed and healthy looking    GROUP:    Group Participation: no  Participation Quality: Minimal    Notes:         Electronically signed by Marcell Lundborg, RN on 7/26/21 at 8:16 AM CDT

## 2021-07-26 NOTE — PROGRESS NOTES
Patient informed writer that he had belongings with security as we were discussing his discharge. Checked property list, and it reported patient did not come to [facility] unit with any property, clothing or valuables. Called security and  found patient's belonging bag in ED security room. Belongings and valuables given to patient, including clothing, boots, cell phone and wallet.     Electronically signed by Karey Patel RN on 7/26/2021 at 11:39 AM

## 2021-07-26 NOTE — DISCHARGE INSTR - COC
Continuity of Care Form    Patient Name: Idris Wiley   :    MRN:  [id number]    Admit date:  2021  Discharge date:  21    Code Status Order: Full Code   Advance Directives:     Admitting Physician:  Aly Melvin MD  PCP: No primary care provider on file. Discharging Nurse: Tariq Perez [place] Unit/Room#: 8324/800-41  Discharging Unit Phone Number: [phone]    Emergency Contact:   Extended Emergency Contact Information  Primary Emergency Contact: Leonel Coley  Mobile Phone: [phone]  Relation: Spouse   needed? No    Past Surgical History:  Past Surgical History:   Procedure Laterality Date    BLADDER SURGERY      TURB       Immunization History: There is no immunization history on file for this patient. Active Problems:  Patient Active Problem List   Diagnosis Code    Persistent mood (affective) disorder, unspecified ([facility]) F34.9    Cognitive impairment R41.89    Familial tremor G25.0       Isolation/Infection:   Isolation          No Isolation        Patient Infection Status     None to display          Nurse Assessment:  Last Vital Signs: /72   Pulse 72   Temp 98 °F (36.7 °C) (Temporal)   Resp 16   SpO2 98%     Last documented pain score (0-10 scale): Pain Level: 0  Last Weight:   Wt Readings from Last 1 Encounters:   21 194 lb 1.6 oz (88 kg)     Mental Status:  oriented and alert    IV Access:  - None    Nursing Mobility/ADLs:  Walking   Independent  Transfer  Independent  Bathing  Independent  Dressing  Independent  [address]  Med Delivery   whole    Wound Care Documentation and Therapy:        Elimination:  Continence:   · Bowel: Yes  · Bladder: Yes  Urinary Catheter: None   Colostomy/Ileostomy/Ileal Conduit: No       Date of Last BM: 21  No intake or output data in the 24 hours ending 21 0922  No intake/output data recorded.     Safety Concerns:     None    Impairments/Disabilities:      None    Nutrition Therapy:  Current Nutrition Therapy:   - Oral Diet:  General    Routes of Feeding: Oral  Liquids: No Restrictions  Daily Fluid Restriction: no  Last Modified Barium Swallow with Video (Video Swallowing Test): not done    Treatments at the Time of Hospital Discharge:   Respiratory Treatments: None  Oxygen Therapy:  is not on home oxygen therapy. Ventilator:    - No ventilator support    Rehab Therapies: Denies  Weight Bearing Status/Restrictions: No weight bearing restirctions  Other Medical Equipment (for information only, NOT a DME order):  none  Other Treatments: none    Patient's personal belongings (please select all that are sent with patient): All personal belongings returned to patient including, clothing, cell phone, boots, wallet    RN SIGNATURE:  Electronically signed by Shanta Jaquez RN on 7/26/21 at 9:38 AM CDT    CASE MANAGEMENT/SOCIAL WORK SECTION    Inpatient Status Date: 7/20/21    Readmission Risk Assessment Score:  Readmission Risk              Risk of Unplanned Readmission:  10           Discharging to Facility/ Agency   · Name:   · Address:  · Phone:  · Fax:    Dialysis Facility (if applicable)   · Name:  · Address:  · Dialysis Schedule:  · Phone:  · Fax:    / signature: {Esignature:[id number]}    PHYSICIAN SECTION    Prognosis: Good    Condition at Discharge: Stable    Rehab Potential (if transferring to Rehab): n/a    Recommended Labs or Other Treatments After Discharge: None    Physician Certification: I certify the above information and transfer of Yoan Reno  is necessary for the continuing treatment of the diagnosis listed and that he requires {Admit to Appropriate Level of Care:52226} for {GREATER/LESS:772743445} 30 days.      Update Admission H&P: {CHP DME Changes in HRQSY:052335050}    PHYSICIAN SIGNATURE:  {Esignature:[id number]}
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
## 2021-07-26 NOTE — PROGRESS NOTES
SW met with treatment team to discuss pt's progress and setbacks. SW 2 was present. Pt reportedly slept well last night, with medications, appetite is good, minimal participation in scheduled group activities, isolative to self, performs ADLS, behavior has been cooperative, denies depression/anxiety, denies SI/HI/AVH, possible discharge today, follow-up appointments will be scheduled.

## 2021-07-26 NOTE — PROGRESS NOTES
[facility]  Discharge Note  Bridge Appointment completed: Reviewed Discharge Instructions with patient. Patient verbalizes understanding and agreement with the discharge plan using the teachback method. Referral for Outpatient Tobacco Cessation Counseling, upon discharge (eliazar X if applicable and completed):    ( )  Hospital staff assisted patient to call Quit Line or faxed referral                                   during hospitalization                  ( )  Recognizing danger situations (included triggers and roadblocks), if not completed on admission                    ( )  Coping skills (new ways to manage stress, exercise, relaxation techniques, changing routine, distraction), if not completed on admission                                                           ( )  Basic information about quitting (benefits of quitting, techniques in how to quit, available resources, if not completed on admission  ( ) Referral for counseling faxed to [facility]   ( x) Patient refused referral  (x ) Patient refused counseling  (x ) Patient refused smoking cessation medication upon discharge    Vaccinations (eliazar X if applicable and completed):  ( ) Patient states already received influenza vaccine elsewhere  ( ) Patient received influenza vaccine during this hospitalization  (x ) Patient refused influenza vaccine at this time      Pt discharged with followings belongings:   Dentures: None  Vision - Corrective Lenses: None  Hearing Aid: None  Jewelry: None  Body Piercings Removed: N/A  Clothing: Other (Comment) (brought up in security bag from security and given to One Arch Fernandez)  Were All Patient Medications Collected?: No  Other Valuables: Other (Comment) (belongings were not on unit,called security & in ER see note)   Valuables sent home with patient. Valuables retrieved from safe and returned to patient. Patient left department with  family via  auto, discharged to  home.  Patient education on aftercare instructions: yes  Patient verbalize understanding of AVS:  yes. Suicidal Ideations? No AVH? denies HI?  Negative for homicidal ideation       Status EXAM upon discharge:  Status and Exam  Normal: Yes  Facial Expression:  (Congruent)  Affect: Congruent  Level of Consciousness: Alert  Mood:Normal: Yes  Mood: Other (Comment) (normal)  Motor Activity:Normal: Yes  Motor Activity: Other(See Comments) (normal)  Interview Behavior: Cooperative  Preception: [place] to Person, Jessica Colonel to Time, [place] to Place, [place] to Situation  Attention:Normal: Yes  Attention: Others(See comment) (good attention)  Thought Processes: Circumstantial  Thought Content:Normal: Yes  Thought Content: Other(See Comment)  Hallucinations: None  Delusions: No  Memory:Normal: Yes  Memory: Other(See comment)  Insight and Judgment: Yes  Insight and Judgment: Other(See comment) (good)  Present Suicidal Ideation: No  Present Homicidal Ideation: No

## 2021-07-26 NOTE — SUICIDE SAFETY PLAN
SAFETY PLAN    A suicide Safety Plan is a document that supports someone when they are having thoughts of suicide. Warning Signs that indicate a suicidal crisis may be developing: What (situations, thoughts, feelings, body sensations, behaviors, etc.) do you experience that lets you know you are beginning to think about suicide? 1. Feeling anxious  2.   3. Internal Coping Strategies:  What things can I do (relaxation techniques, hobbies, physical activities, etc.) to take my mind off my problems without contacting another person? 1. Work on old trucks  2.   3.     People and social settings that provide distraction: Who can I call or where can I go to distract me? 1. Name: Arnold blackwood - Wife  Phone: [phone]  2. Name: Mirza Rust - Son  Phone: Has at home   3. Place:  [facility]           4. Place:     People whom I can ask for help: Who can I call when I need help - for example, friends, family, clergy, someone else? 1. Name: Arnold blackwood                Phone: [phone]  2. Name: Mirza Rust  Phone: Has at home  3. Name:   Phone:       Professionals or [address] [place], [place] [address]vd I can contact during a crisis: Who can I call for help - for example, my doctor, my psychiatrist, my psychologist, a mental health provider, a suicide hotline? 1. Clinician Name: [id number] FR[address]RICK Marcos   Phone: [phone]      Clinician Pager or Emergency Contact #: [phone]    2. Clinician Name: [address]   Phone: [phone]      Clinician Pager or Emergency Contact #: [phone]    3. Suicide Prevention Lifeline: 5-102-945-TALK (4229)    4. Local [facility] Emergency Services : [facility]      Emergency Services Address: [address] [facility]  Emergency Services Phone: [phone]    Making the environment safe: How can I make my environment (house/apartment/living space) safer? For example, can I remove guns, medications, and other items? 1.  Remove all guns and weapons from the home. 2. Discard any extra medications in the home.

## 2021-07-26 NOTE — PLAN OF CARE
Group Therapy Note    Date: 7/26/2021  Start Time: 0930  End Time:  1000  Number of Participants: 3    Type of Group: Psychoeducation    Wellness Binder Information  Module Name:  Relapse Prevention  Session Number:  5    Group Goal for Pt: To improve knowledge of relapse prevention strategies    Notes:  Pt demonstrated improved knowledge of relapse prevention strategies by actively participating in group discussion. Status After Intervention:  Unchanged    Participation Level:  Active Listener and Interactive    Participation Quality: Appropriate and Attentive      Speech:  normal      Thought Process/Content: Logical      Affective Functioning: Congruent      Mood: anxious and depressed      Level of consciousness:  Alert and Oriented x4      Response to Learning: Able to verbalize current knowledge/experience, Able to verbalize/acknowledge new learning, and Progressing to goal      Endings: None Reported    Modes of Intervention: Education      Discipline Responsible: Psychoeducational Specialist      Signature:  Pavan Tabares

## 2021-07-26 NOTE — PLAN OF CARE
Problem: Falls - Risk of:  Goal: Will remain free from falls  Description: Will remain free from falls  7/26/2021 0247 by Kate Kelly RN  Outcome: Met This Shift  7/26/2021 0244 by Kate Kelly RN  Outcome: Met This Shift  Goal: Absence of physical injury  Description: Absence of physical injury  7/26/2021 0247 by Kate Kelly RN  Outcome: Met This Shift  7/26/2021 0244 by Kate Kelly RN  Outcome: Met This Shift     Problem: Depressive Behavior With or Without Suicide Precautions:  Goal: Absence of self-harm  Description: Absence of self-harm  7/26/2021 0247 by Kate Kelly RN  Outcome: Met This Shift  7/26/2021 0244 by Kate Kelly RN  Outcome: Met This Shift     Problem: Depressive Behavior With or Without Suicide Precautions:  Goal: Able to verbalize acceptance of life and situations over which he or she has no control  Description: Able to verbalize acceptance of life and situations over which he or she has no control  7/26/2021 0247 by Kate Kelly RN  Outcome: Ongoing  7/26/2021 0244 by Kate Kelly RN  Outcome: Ongoing  Goal: Able to verbalize and/or display a decrease in depressive symptoms  Description: Able to verbalize and/or display a decrease in depressive symptoms  7/26/2021 0247 by Kate Kelly RN  Outcome: Ongoing  7/26/2021 0244 by Kate Kelly RN  Outcome: Ongoing  Goal: Ability to disclose and discuss suicidal ideas will improve  Description: Ability to disclose and discuss suicidal ideas will improve  7/26/2021 0247 by Kate Kelly RN  Outcome: Ongoing  7/26/2021 0244 by Kate Kelly RN  Outcome: Ongoing  Goal: Able to verbalize support systems  Description: Able to verbalize support systems  7/26/2021 0247 by Kate Kelly RN  Outcome: Ongoing  7/26/2021 0244 by Kate Kelly RN  Outcome: Ongoing  Goal: Patient specific goal  Description: Patient specific goal  7/26/2021 0247 by Kate Kelly RN  Outcome: Ongoing  7/26/2021 0244 by Kate Kelly RN  Outcome: Ongoing  Goal: Participates in care planning  Description: Participates in care planning  7/26/2021 0247 by Jaiden Martinez RN  Outcome: Ongoing  7/26/2021 0244 by Jaiden Martinez RN  Outcome: Ongoing     Problem: Pain:  Goal: Pain level will decrease  Description: Pain level will decrease  7/26/2021 0247 by Jaiden Martinez RN  Outcome: Ongoing  7/26/2021 0244 by Jaiden Martinez RN  Outcome: Ongoing  Goal: Control of acute pain  Description: Control of acute pain  7/26/2021 0247 by Jaiden Martinez RN  Outcome: Ongoing  7/26/2021 0244 by Jaiden Martinez RN  Outcome: Ongoing  Goal: Control of chronic pain  Description: Control of chronic pain  7/26/2021 0247 by Jaiden Martinez RN  Outcome: Ongoing  7/26/2021 0244 by Jaiden Martinez RN  Outcome: Ongoing

## 2023-10-03 ENCOUNTER — PREP FOR SURGERY (OUTPATIENT)
Dept: OTHER | Facility: HOSPITAL | Age: 73
End: 2023-10-03
Payer: MEDICARE

## 2023-10-03 ENCOUNTER — TELEPHONE (OUTPATIENT)
Dept: CARDIOLOGY | Facility: CLINIC | Age: 73
End: 2023-10-03
Payer: MEDICARE

## 2023-10-03 DIAGNOSIS — R93.1 ABNORMAL CARDIAC CT ANGIOGRAPHY: Primary | ICD-10-CM

## 2023-10-03 RX ORDER — ASPIRIN 81 MG/1
81 TABLET ORAL DAILY
OUTPATIENT
Start: 2023-10-04

## 2023-10-03 RX ORDER — ONDANSETRON 2 MG/ML
4 INJECTION INTRAMUSCULAR; INTRAVENOUS EVERY 6 HOURS PRN
OUTPATIENT
Start: 2023-10-03

## 2023-10-03 RX ORDER — SODIUM CHLORIDE 9 MG/ML
40 INJECTION, SOLUTION INTRAVENOUS AS NEEDED
OUTPATIENT
Start: 2023-10-03

## 2023-10-03 RX ORDER — SODIUM CHLORIDE 0.9 % (FLUSH) 0.9 %
10 SYRINGE (ML) INJECTION EVERY 12 HOURS SCHEDULED
OUTPATIENT
Start: 2023-10-03

## 2023-10-03 RX ORDER — SODIUM CHLORIDE 0.9 % (FLUSH) 0.9 %
10 SYRINGE (ML) INJECTION AS NEEDED
OUTPATIENT
Start: 2023-10-03

## 2023-10-03 RX ORDER — ASPIRIN 325 MG
325 TABLET ORAL ONCE
OUTPATIENT
Start: 2023-10-03 | End: 2023-10-03

## 2023-10-03 RX ORDER — NITROGLYCERIN 0.4 MG/1
0.4 TABLET SUBLINGUAL
OUTPATIENT
Start: 2023-10-03

## 2023-10-03 NOTE — TELEPHONE ENCOUNTER
RECORDS RECEIVED FROM [facility] ON PT NEEDING HEART CATH - PLEASE REVIEW / ORDER

## 2023-10-20 PROBLEM — R93.1 ABNORMAL CARDIAC CT ANGIOGRAPHY: Status: ACTIVE | Noted: 2023-10-03

## 2023-10-25 ENCOUNTER — HOSPITAL ENCOUNTER (OUTPATIENT)
Dept: CARDIOLOGY | Facility: HOSPITAL | Age: 73
Discharge: HOME | End: 2023-10-25
Payer: MEDICARE

## 2023-10-25 DIAGNOSIS — R06.02 SHORTNESS OF BREATH: ICD-10-CM

## 2023-12-21 ENCOUNTER — HOSPITAL ENCOUNTER (OUTPATIENT)
Facility: HOSPITAL | Age: 73
Discharge: HOME | End: 2023-12-22
Attending: INTERNAL MEDICINE | Admitting: INTERNAL MEDICINE
Payer: MEDICARE

## 2023-12-21 DIAGNOSIS — R93.1 ABNORMAL CARDIAC CT ANGIOGRAPHY: ICD-10-CM

## 2023-12-21 PROBLEM — I25.10 CAD (CORONARY ARTERY DISEASE): Status: ACTIVE | Noted: 2023-12-21

## 2023-12-21 LAB
ALBUMIN SERPL-MCNC: 4.3 G/DL (ref 3.5–5.2)
ALBUMIN/GLOB SERPL: 1.1 G/DL
ALP SERPL-CCNC: 96 U/L (ref 39–117)
ALT SERPL W P-5'-P-CCNC: 12 U/L (ref 1–41)
ANION GAP SERPL CALCULATED.3IONS-SCNC: 13 MMOL/L (ref 5–15)
AST SERPL-CCNC: 16 U/L (ref 1–40)
BILIRUB SERPL-MCNC: 0.4 MG/DL (ref 0–1.2)
BUN SERPL-MCNC: 22 MG/DL (ref 8–23)
BUN/CREAT SERPL: 22 (ref 7–25)
CALCIUM SPEC-SCNC: 8.5 MG/DL (ref 8.6–10.5)
CHLORIDE SERPL-SCNC: 101 MMOL/L (ref 98–107)
CO2 SERPL-SCNC: 25 MMOL/L (ref 22–29)
CREAT SERPL-MCNC: 1 MG/DL (ref 0.76–1.27)
DEPRECATED RDW RBC AUTO: 42.6 FL (ref 37–54)
EGFRCR SERPLBLD CKD-EPI 2021: 80 ML/MIN/1.73
ERYTHROCYTE [DISTWIDTH] IN BLOOD BY AUTOMATED COUNT: 13 % (ref 12.3–15.4)
GLOBULIN UR ELPH-MCNC: 3.9 GM/DL
GLUCOSE SERPL-MCNC: 117 MG/DL (ref 65–99)
HCT VFR BLD AUTO: 46.3 % (ref 37.5–51)
HGB BLD-MCNC: 15.1 G/DL (ref 13–17.7)
MCH RBC QN AUTO: 29.2 PG (ref 26.6–33)
MCHC RBC AUTO-ENTMCNC: 32.6 G/DL (ref 31.5–35.7)
MCV RBC AUTO: 89.4 FL (ref 79–97)
PLATELET # BLD AUTO: 256 10*3/MM3 (ref 140–450)
PMV BLD AUTO: 10.2 FL (ref 6–12)
POTASSIUM SERPL-SCNC: 3.7 MMOL/L (ref 3.5–5.2)
PROT SERPL-MCNC: 8.2 G/DL (ref 6–8.5)
RBC # BLD AUTO: 5.18 10*6/MM3 (ref 4.14–5.8)
SODIUM SERPL-SCNC: 139 MMOL/L (ref 136–145)
WBC NRBC COR # BLD AUTO: 5.91 10*3/MM3 (ref 3.4–10.8)

## 2023-12-21 PROCEDURE — 63710000001 TAMSULOSIN 0.4 MG CAPSULE: Performed by: INTERNAL MEDICINE

## 2023-12-21 PROCEDURE — 93458 L HRT ARTERY/VENTRICLE ANGIO: CPT | Performed by: INTERNAL MEDICINE

## 2023-12-21 PROCEDURE — C1769 GUIDE WIRE: HCPCS | Performed by: INTERNAL MEDICINE

## 2023-12-21 PROCEDURE — C1887 CATHETER, GUIDING: HCPCS | Performed by: INTERNAL MEDICINE

## 2023-12-21 PROCEDURE — 25810000003 SODIUM CHLORIDE 0.9 % SOLUTION: Performed by: INTERNAL MEDICINE

## 2023-12-21 PROCEDURE — S0260 H&P FOR SURGERY: HCPCS | Performed by: INTERNAL MEDICINE

## 2023-12-21 PROCEDURE — 25510000001 IOPAMIDOL PER 1 ML: Performed by: INTERNAL MEDICINE

## 2023-12-21 PROCEDURE — 25010000002 HEPARIN (PORCINE) 2000-0.9 UNIT/L-% SOLUTION: Performed by: INTERNAL MEDICINE

## 2023-12-21 PROCEDURE — 25010000002 MIDAZOLAM PER 1 MG: Performed by: INTERNAL MEDICINE

## 2023-12-21 PROCEDURE — 63710000001 ATORVASTATIN 40 MG TABLET: Performed by: INTERNAL MEDICINE

## 2023-12-21 PROCEDURE — 63710000001 ACETAMINOPHEN 325 MG TABLET: Performed by: INTERNAL MEDICINE

## 2023-12-21 PROCEDURE — A9270 NON-COVERED ITEM OR SERVICE: HCPCS | Performed by: INTERNAL MEDICINE

## 2023-12-21 PROCEDURE — 93571 IV DOP VEL&/PRESS C FLO 1ST: CPT | Performed by: INTERNAL MEDICINE

## 2023-12-21 PROCEDURE — 80053 COMPREHEN METABOLIC PANEL: CPT | Performed by: NURSE PRACTITIONER

## 2023-12-21 PROCEDURE — 25010000002 FENTANYL CITRATE (PF) 50 MCG/ML SOLUTION: Performed by: INTERNAL MEDICINE

## 2023-12-21 PROCEDURE — 93799 UNLISTED CV SVC/PROCEDURE: CPT | Performed by: INTERNAL MEDICINE

## 2023-12-21 PROCEDURE — C1725 CATH, TRANSLUMIN NON-LASER: HCPCS | Performed by: INTERNAL MEDICINE

## 2023-12-21 PROCEDURE — 63710000001 AMLODIPINE 5 MG TABLET: Performed by: INTERNAL MEDICINE

## 2023-12-21 PROCEDURE — C1894 INTRO/SHEATH, NON-LASER: HCPCS | Performed by: INTERNAL MEDICINE

## 2023-12-21 PROCEDURE — 85027 COMPLETE CBC AUTOMATED: CPT | Performed by: NURSE PRACTITIONER

## 2023-12-21 PROCEDURE — C9600 PERC DRUG-EL COR STENT SING: HCPCS | Performed by: INTERNAL MEDICINE

## 2023-12-21 PROCEDURE — 92928 PRQ TCAT PLMT NTRAC ST 1 LES: CPT | Performed by: INTERNAL MEDICINE

## 2023-12-21 PROCEDURE — 25010000002 DIPHENHYDRAMINE PER 50 MG: Performed by: INTERNAL MEDICINE

## 2023-12-21 PROCEDURE — C1760 CLOSURE DEV, VASC: HCPCS | Performed by: INTERNAL MEDICINE

## 2023-12-21 PROCEDURE — 25010000002 HEPARIN (PORCINE) 1000-0.9 UT/500ML-% SOLUTION: Performed by: INTERNAL MEDICINE

## 2023-12-21 PROCEDURE — C1874 STENT, COATED/COV W/DEL SYS: HCPCS | Performed by: INTERNAL MEDICINE

## 2023-12-21 DEVICE — XIENCE SKYPOINT™ EVEROLIMUS ELUTING CORONARY STENT SYSTEM 3.50 MM X 33 MM / RAPID-EXCHANGE
Type: IMPLANTABLE DEVICE | Site: CORONARY | Status: FUNCTIONAL
Brand: XIENCE SKYPOINT™

## 2023-12-21 RX ORDER — CLOPIDOGREL BISULFATE 75 MG/1
75 TABLET ORAL DAILY
Status: DISCONTINUED | OUTPATIENT
Start: 2023-12-22 | End: 2023-12-22 | Stop reason: HOSPADM

## 2023-12-21 RX ORDER — SODIUM CHLORIDE 0.9 % (FLUSH) 0.9 %
10 SYRINGE (ML) INJECTION EVERY 12 HOURS SCHEDULED
Status: DISCONTINUED | OUTPATIENT
Start: 2023-12-21 | End: 2023-12-21 | Stop reason: HOSPADM

## 2023-12-21 RX ORDER — HEPARIN SODIUM 200 [USP'U]/100ML
INJECTION, SOLUTION INTRAVENOUS
Status: DISCONTINUED | OUTPATIENT
Start: 2023-12-21 | End: 2023-12-21 | Stop reason: HOSPADM

## 2023-12-21 RX ORDER — ASPIRIN 325 MG
325 TABLET ORAL ONCE
Status: DISCONTINUED | OUTPATIENT
Start: 2023-12-21 | End: 2023-12-21 | Stop reason: SDUPTHER

## 2023-12-21 RX ORDER — VERAPAMIL HYDROCHLORIDE 2.5 MG/ML
INJECTION, SOLUTION INTRAVENOUS
Status: DISCONTINUED | OUTPATIENT
Start: 2023-12-21 | End: 2023-12-21 | Stop reason: HOSPADM

## 2023-12-21 RX ORDER — ASPIRIN 81 MG/1
81 TABLET ORAL DAILY
Status: DISCONTINUED | OUTPATIENT
Start: 2023-12-22 | End: 2023-12-21 | Stop reason: SDUPTHER

## 2023-12-21 RX ORDER — CALCIUM CARBONATE 500 MG/1
2 TABLET, CHEWABLE ORAL 2 TIMES DAILY PRN
Status: DISCONTINUED | OUTPATIENT
Start: 2023-12-21 | End: 2023-12-22 | Stop reason: HOSPADM

## 2023-12-21 RX ORDER — ISOSORBIDE MONONITRATE 30 MG/1
30 TABLET, EXTENDED RELEASE ORAL EVERY MORNING
COMMUNITY

## 2023-12-21 RX ORDER — LIDOCAINE HYDROCHLORIDE 20 MG/ML
INJECTION, SOLUTION INFILTRATION; PERINEURAL
Status: DISCONTINUED | OUTPATIENT
Start: 2023-12-21 | End: 2023-12-21 | Stop reason: HOSPADM

## 2023-12-21 RX ORDER — ATORVASTATIN CALCIUM 40 MG/1
40 TABLET, FILM COATED ORAL NIGHTLY
Status: DISCONTINUED | OUTPATIENT
Start: 2023-12-21 | End: 2023-12-22 | Stop reason: HOSPADM

## 2023-12-21 RX ORDER — ONDANSETRON 4 MG/1
4 TABLET, ORALLY DISINTEGRATING ORAL EVERY 6 HOURS PRN
Status: DISCONTINUED | OUTPATIENT
Start: 2023-12-21 | End: 2023-12-22 | Stop reason: HOSPADM

## 2023-12-21 RX ORDER — DIPHENHYDRAMINE HCL 25 MG
25 CAPSULE ORAL EVERY 6 HOURS PRN
Status: DISCONTINUED | OUTPATIENT
Start: 2023-12-21 | End: 2023-12-22 | Stop reason: HOSPADM

## 2023-12-21 RX ORDER — NITROGLYCERIN 0.4 MG/1
0.4 TABLET SUBLINGUAL
Status: DISCONTINUED | OUTPATIENT
Start: 2023-12-21 | End: 2023-12-21 | Stop reason: HOSPADM

## 2023-12-21 RX ORDER — AMLODIPINE BESYLATE 5 MG/1
5 TABLET ORAL DAILY
Status: DISCONTINUED | OUTPATIENT
Start: 2023-12-21 | End: 2023-12-22 | Stop reason: HOSPADM

## 2023-12-21 RX ORDER — ACETAMINOPHEN 325 MG/1
650 TABLET ORAL EVERY 4 HOURS PRN
Status: DISCONTINUED | OUTPATIENT
Start: 2023-12-21 | End: 2023-12-22 | Stop reason: HOSPADM

## 2023-12-21 RX ORDER — CLOPIDOGREL BISULFATE 75 MG/1
TABLET ORAL
Status: DISCONTINUED | OUTPATIENT
Start: 2023-12-21 | End: 2023-12-21 | Stop reason: HOSPADM

## 2023-12-21 RX ORDER — AMLODIPINE BESYLATE 5 MG/1
5 TABLET ORAL DAILY
COMMUNITY

## 2023-12-21 RX ORDER — ONDANSETRON 2 MG/ML
4 INJECTION INTRAMUSCULAR; INTRAVENOUS EVERY 6 HOURS PRN
Status: DISCONTINUED | OUTPATIENT
Start: 2023-12-21 | End: 2023-12-22 | Stop reason: HOSPADM

## 2023-12-21 RX ORDER — ONDANSETRON 2 MG/ML
4 INJECTION INTRAMUSCULAR; INTRAVENOUS EVERY 6 HOURS PRN
Status: DISCONTINUED | OUTPATIENT
Start: 2023-12-21 | End: 2023-12-21 | Stop reason: HOSPADM

## 2023-12-21 RX ORDER — ASPIRIN 81 MG/1
81 TABLET ORAL DAILY
Status: DISCONTINUED | OUTPATIENT
Start: 2023-12-22 | End: 2023-12-22 | Stop reason: HOSPADM

## 2023-12-21 RX ORDER — ASPIRIN 81 MG/1
324 TABLET, CHEWABLE ORAL ONCE
Status: COMPLETED | OUTPATIENT
Start: 2023-12-21 | End: 2023-12-21

## 2023-12-21 RX ORDER — SODIUM CHLORIDE 0.9 % (FLUSH) 0.9 %
10 SYRINGE (ML) INJECTION AS NEEDED
Status: DISCONTINUED | OUTPATIENT
Start: 2023-12-21 | End: 2023-12-21 | Stop reason: HOSPADM

## 2023-12-21 RX ORDER — SODIUM CHLORIDE 9 MG/ML
75 INJECTION, SOLUTION INTRAVENOUS CONTINUOUS
Status: DISCONTINUED | OUTPATIENT
Start: 2023-12-21 | End: 2023-12-22 | Stop reason: HOSPADM

## 2023-12-21 RX ORDER — FENTANYL CITRATE 50 UG/ML
INJECTION, SOLUTION INTRAMUSCULAR; INTRAVENOUS
Status: DISCONTINUED | OUTPATIENT
Start: 2023-12-21 | End: 2023-12-21 | Stop reason: HOSPADM

## 2023-12-21 RX ORDER — DIPHENHYDRAMINE HYDROCHLORIDE 50 MG/ML
INJECTION INTRAMUSCULAR; INTRAVENOUS
Status: DISCONTINUED | OUTPATIENT
Start: 2023-12-21 | End: 2023-12-21 | Stop reason: HOSPADM

## 2023-12-21 RX ORDER — HYDROCODONE BITARTRATE AND ACETAMINOPHEN 7.5; 325 MG/1; MG/1
1 TABLET ORAL EVERY 8 HOURS PRN
Status: DISCONTINUED | OUTPATIENT
Start: 2023-12-21 | End: 2023-12-22 | Stop reason: HOSPADM

## 2023-12-21 RX ORDER — SODIUM CHLORIDE 9 MG/ML
40 INJECTION, SOLUTION INTRAVENOUS AS NEEDED
Status: DISCONTINUED | OUTPATIENT
Start: 2023-12-21 | End: 2023-12-21 | Stop reason: HOSPADM

## 2023-12-21 RX ORDER — TEMAZEPAM 7.5 MG/1
7.5 CAPSULE ORAL NIGHTLY PRN
Status: DISCONTINUED | OUTPATIENT
Start: 2023-12-21 | End: 2023-12-22 | Stop reason: HOSPADM

## 2023-12-21 RX ORDER — METOPROLOL SUCCINATE 25 MG/1
25 TABLET, EXTENDED RELEASE ORAL DAILY
COMMUNITY
End: 2023-12-22 | Stop reason: HOSPADM

## 2023-12-21 RX ORDER — MIDAZOLAM HYDROCHLORIDE 1 MG/ML
INJECTION INTRAMUSCULAR; INTRAVENOUS
Status: DISCONTINUED | OUTPATIENT
Start: 2023-12-21 | End: 2023-12-21 | Stop reason: HOSPADM

## 2023-12-21 RX ORDER — TAMSULOSIN HYDROCHLORIDE 0.4 MG/1
0.4 CAPSULE ORAL NIGHTLY
Status: DISCONTINUED | OUTPATIENT
Start: 2023-12-21 | End: 2023-12-22 | Stop reason: HOSPADM

## 2023-12-21 RX ORDER — ALPRAZOLAM 0.5 MG/1
0.5 TABLET ORAL 3 TIMES DAILY PRN
Status: DISCONTINUED | OUTPATIENT
Start: 2023-12-21 | End: 2023-12-22 | Stop reason: HOSPADM

## 2023-12-21 RX ORDER — NITROGLYCERIN 0.4 MG/1
0.4 TABLET SUBLINGUAL
Status: DISCONTINUED | OUTPATIENT
Start: 2023-12-21 | End: 2023-12-22 | Stop reason: HOSPADM

## 2023-12-21 RX ORDER — HYDROCHLOROTHIAZIDE 25 MG/1
25 TABLET ORAL DAILY
COMMUNITY
End: 2023-12-22 | Stop reason: HOSPADM

## 2023-12-21 RX ORDER — SALICYLIC ACID 40 %
81 ADHESIVE PATCH, MEDICATED TOPICAL DAILY
COMMUNITY

## 2023-12-21 RX ORDER — ISOSORBIDE MONONITRATE 30 MG/1
30 TABLET, EXTENDED RELEASE ORAL
Status: DISCONTINUED | OUTPATIENT
Start: 2023-12-22 | End: 2023-12-22 | Stop reason: HOSPADM

## 2023-12-21 RX ADMIN — SODIUM CHLORIDE 75 ML/HR: 9 INJECTION, SOLUTION INTRAVENOUS at 14:37

## 2023-12-21 RX ADMIN — ASPIRIN 324 MG: 81 TABLET, CHEWABLE ORAL at 09:54

## 2023-12-21 RX ADMIN — ACETAMINOPHEN 650 MG: 325 TABLET, FILM COATED ORAL at 19:50

## 2023-12-21 RX ADMIN — TAMSULOSIN HYDROCHLORIDE 0.4 MG: 0.4 CAPSULE ORAL at 20:54

## 2023-12-21 RX ADMIN — AMLODIPINE BESYLATE 5 MG: 5 TABLET ORAL at 15:11

## 2023-12-21 RX ADMIN — ATORVASTATIN CALCIUM 40 MG: 40 TABLET ORAL at 20:54

## 2023-12-21 NOTE — Clinical Note
inserted over wire. You can access the FollowMyHealth Patient Portal offered by [facility] by registering at the following website: [email or web address]. By joining [employer]’s FollowMyHealth portal, you will also be able to view your health information using other applications (apps) compatible with our system.

## 2023-12-21 NOTE — H&P
LOS: 0 days   Patient Care Team:  Nasim Schafer PA as PCP - General (Family Medicine)  Kehinde Aceves MD (Inactive) as Consulting Physician (Urology)    Chief Complaint: Shortness of breath     Subjective    Duarte Brennan is a 73 y.o. male who is being seen    Chest pain both with exertion as well as at rest.  Feels episodes of chest pain occur more often with exertion  Moderate substernal,   Pressure like   Chest pain non pleuritic  Chest pain non positional and non gustatory   Lasts less than 5 minutes    Started more than 3 months ago  Occurs once or twice a week on the average but can be variable in frequency  No associated diaphoresis    No associated nausea  No radiation    Relieved with rest or spontaneously  Not positional    No change with intake of food or antacids  No change with breathing  Mild to moderate associated dyspnea    No similar chest pain episodes in the past   No anticipated endoscopic or any surgical procedures over the next 1 year    No bleeding, excessive bruising, gait instability or fall risks               Review of Systems   Constitutional: No chills   Has fatigue   No fever.   HENT: Negative.    Eyes: Negative.    Respiratory: Negative for cough,   No chest wall soreness,   Shortness of breath,   no wheezing, no stridor.    Cardiovascular: As above  Gastrointestinal: Negative for abdominal distention,  No abdominal pain,   No blood in stool,   No constipation,   No diarrhea,   No nausea   No vomiting.   Endocrine: Negative.    Genitourinary: Negative for difficulty urinating, dysuria, flank pain and hematuria.   Musculoskeletal: Negative.    Skin: Negative for rash and wound.   Allergic/Immunologic: Negative.    Neurological: Negative for dizziness, syncope, weakness,   No light-headedness  No  headaches.   Hematological: Does not bruise/bleed easily.   Psychiatric/Behavioral: Negative for agitation or behavioral problems,   No confusion,   the patient is   "nervous/anxious.       History:   Past Medical History:   Diagnosis Date    BPH (benign prostatic hyperplasia)     Hypertension     Parkinson disease      Past Surgical History:   Procedure Laterality Date    TRANSURETHRAL RESECTION OF BLADDER TUMOR N/A 2/6/2017    Procedure: CYSTOSCOPY Bladder biopsies, Fulgaration active bleeding trigon.;  Surgeon: Chuck Venegas MD;  Location: [facility] OR;  Service:      Social History     Socioeconomic History    Marital status:    Tobacco Use    Smoking status: Some Days     Types: Cigarettes    Tobacco comments:     pt states he is trying to quit and only smokes every couple of weeks   Substance and Sexual Activity    Alcohol use: No    Drug use: Yes     Types: Methamphetamines     Comment: unknown- per pt's family report     Family History   Problem Relation Age of Onset    No Known Problems Father     No Known Problems Mother        Labs:  WBC WBC   Date Value Ref Range Status   12/21/2023 5.91 3.40 - 10.80 10*3/mm3 Final      HGB Hemoglobin   Date Value Ref Range Status   12/21/2023 15.1 13.0 - 17.7 g/dL Final      HCT Hematocrit   Date Value Ref Range Status   12/21/2023 46.3 37.5 - 51.0 % Final      Platelets Platelets   Date Value Ref Range Status   12/21/2023 256 140 - 450 10*3/mm3 Final      MCV MCV   Date Value Ref Range Status   12/21/2023 89.4 79.0 - 97.0 fL Final        Results from last 7 days   Lab Units 12/21/23  0936   SODIUM mmol/L 139   POTASSIUM mmol/L 3.7   CHLORIDE mmol/L 101   CO2 mmol/L 25.0   BUN mg/dL 22   CREATININE mg/dL 1.00   CALCIUM mg/dL 8.5*   BILIRUBIN mg/dL 0.4   ALK PHOS U/L 96   ALT (SGPT) U/L 12   AST (SGOT) U/L 16   GLUCOSE mg/dL 117*     Lab Results   Component Value Date    CKTOTAL 194 07/22/2021    TROPONINI <0.01 07/20/2021     PT/INR:  No results found for: \"PROTIME\"/No results found for: \"INR\"    Imaging Results (Last 72 Hours)       ** No results found for the last 72 hours. **            Objective     No Known " "Allergies    Medication Review: Performed  Current Facility-Administered Medications   Medication Dose Route Frequency Provider Last Rate Last Admin    nitroglycerin (NITROSTAT) SL tablet 0.4 mg  0.4 mg Sublingual Q5 Min PRN Anette Ernst, APRN        ondansetron (ZOFRAN) injection 4 mg  4 mg Intravenous Q6H PRN Anette Ernst N, APRN        sodium chloride 0.9 % flush 10 mL  10 mL Intravenous Q12H Anette Ernst N, APRN        sodium chloride 0.9 % flush 10 mL  10 mL Intravenous PRN Anette Ernst N, APRN        sodium chloride 0.9 % infusion 40 mL  40 mL Intravenous PRN Anette Ernst N, APRN           Vital Sign Min/Max for last 24 hours  Temp  Min: 98.2 °F (36.8 °C)  Max: 98.2 °F (36.8 °C)   BP  Min: 143/89  Max: 143/89   Pulse  Min: 73  Max: 73   Resp  Min: 16  Max: 16   SpO2  Min: 95 %  Max: 95 %   No data recorded   Weight  Min: 95.2 kg (209 lb 12.8 oz)  Max: 95.2 kg (209 lb 12.8 oz)     Flowsheet Rows      Flowsheet Row First Filed Value   Admission Height 175.3 cm (69\") Documented at 12/21/2023 0948   Admission Weight 95.2 kg (209 lb 12.8 oz) Documented at 12/21/2023 0948                Physical Exam:    General Appearance: Awake, alert, in no acute distress  Eyes: Pupils equal and reactive    Ears: Appear intact with no abnormalities noted  Nose: Nares normal, no drainage  Neck: supple, trachea midline, no carotid bruit and no JVD  Back: no kyphosis present,    Lungs: respirations regular, respirations even and respirations unlabored  Heart: normal S1, S2, no significant murmurs   No gallops or rubs  no rub and no click  Abdomen: normal bowel sounds, no tenderness   Skin: no bleeding, bruising or rash  Extremities: no cyanosis  Psychiatric/Behavioral: Negative for agitation, behavioral problems, confusion, the patient does  appear to be nervous/anxious.       Results Review:   I reviewed the patient's new clinical results.  I reviewed the patient's new imaging results and agree with the " interpretation.  I reviewed the patient's other test results and agree with the interpretation  I personally viewed and interpreted the patient's EKG/Telemetry data    Discussed with patient  Updated patient regarding any new or relevant abnormalities on review of records or any new findings on physical exam.   Mentioned to patient about purpose of visit and desirable health short and long term goals and objectives.     Reviewed available prior notes, consults, prior visits, laboratory findings, radiology and cardiology relevant reports.   Updated chart as applicable.   I have reviewed the patient's medical history in detail and updated the computerized patient record as relevant.          Assessment & Plan       Abnormal cardiac CT angiography  shortness of breath   Coronary calcification  Mild aortic root enlargement    Plan    Recommend cardiac catheterization, selective coronary angiography, left ventriculography and percutaneous coronary intervention with application of arteriotomy hemostatic closure device.    I discussed cardiac catheterization, the procedure, risks (including bleeding, infection, vascular damage [including minor oozing, bruising, bleeding, and up to and including but not limited to the need for vascular surgery, emergency cardiothoracic surgery, contrast reaction, renal failure, respiratory failure, heart attack, stroke, arrhythmia and even death), benefits, and alternatives and the patient has voiced understanding and is willing to proceed.    Adequate pre-hydration and post cardiac catheterization hydration.  Premedications as required and indicated for cardiac catheterization.    No contraindication to drug eluting stent placement if required  Further recommendations pending results of cardiac catheterization        Andreas Knutson MD  12/21/23  11:01 CST    EMR Dragon/Transcription was used to dictate part of this note

## 2023-12-21 NOTE — Clinical Note
First balloon inflation max pressure = 8 dontrell. First balloon inflation duration = 25 seconds. Second inflation of balloon - Max pressure = 8 dontrell. 2nd Inflation of balloon - Duration = 25 seconds.

## 2023-12-21 NOTE — PLAN OF CARE
Goal Outcome Evaluation:  Plan of Care Reviewed With: patient        Progress: no change  Outcome Evaluation: Admit form cath lab today. Heart cath to right groin with stent place. Site is C/D/I and soft. Site to right wrist is C/D/I and soft. cont to monitor.

## 2023-12-21 NOTE — Clinical Note
Assessment and Plan:     Problem List Items Addressed This Visit        Respiratory    Chronic obstructive pulmonary disease ([address] )     Well controlled  Smokes 1/2 PPD  Does not want to quit  UTD with pneumococcal vaccines  Sent albuterol today  Nervous and Auditory    Post herpetic neuralgia       Genitourinary    CKD stage G3a/A1, GFR 45-59 and albumin creatinine ratio <30 mg/g (HCC) - Primary     Decrease neurontin to 700 bid because of kidneyfunction 6000 TID is too much  Increase fluids  Does not use NSAIDS  Recheck 6 months  Other    Tobacco abuse     Tobacco Cessation Counseling: Tobacco cessation counseling and education was provided  The patient is sincerely urged to quit consumption of tobacco  She is not ready to quit tobacco  The numerous health risks of tobacco consumption were discussed  If she decides to quit, there are a number of helpful adjunctive aids, and she can see me to discuss nicotine replacement therapy, chantix, or bupropion anytime in the future  Preventive health issues were discussed with patient, and age appropriate screening tests were ordered as noted in patient's After Visit Summary  Personalized health advice and appropriate referrals for health education or preventive services given if needed, as noted in patient's After Visit Summary       History of Present Illness:     Patient presents for Medicare Annual Wellness visit    Patient Care Team:  Evette Oliver MD as PCP - General (Family Medicine)  TREVOR Thorpe DO     Problem List:     Patient Active Problem List   Diagnosis    CKD stage G3a/A1, GFR 45-59 and albumin creatinine ratio <30 mg/g (HCC)    Chronic obstructive pulmonary disease (HCC)    Disc degeneration, lumbar    Glaucoma    Liver cyst    Osteopenia    Post herpetic neuralgia    Suspicious nevus    Tobacco abuse      Past Medical and Surgical History:     Past Medical History: Removed intact Diagnosis Date    Glaucoma      Past Surgical History:   Procedure Laterality Date    BREAST LUMPECTOMY      HYSTERECTOMY        Family History:     Family History   Problem Relation Age of Onset   Janette Dailey Breast cancer Mother    Janette Dailey Parkinsonism Mother     Stroke Mother     Heart disease Father       Social History:        Social History     Socioeconomic History    Marital status: Single     Spouse name: None    Number of children: None    Years of education: None    Highest education level: None   Occupational History    Occupation: Food Services   Social Needs    Financial resource strain: Not hard at all   Kieran-Joceline insecurity:     Worry: Never true     Inability: Never true    Transportation needs:     Medical: No     Non-medical: No   Tobacco Use    Smoking status: Current Every Day Smoker    Smokeless tobacco: Never Used    Tobacco comment: 48 pp Year cutting down slowly now at 1/2 a day   Substance and Sexual Activity    Alcohol use: No    Drug use: No    Sexual activity: None   Lifestyle    Physical activity:     Days per week: 0 days     Minutes per session: 0 min    Stress: Not at all   Relationships    Social connections:     Talks on phone: Patient refused     Gets together: Patient refused     Attends Sabianist service: Patient refused     Active member of club or organization: Patient refused     Attends meetings of clubs or organizations: Patient refused     Relationship status: Patient refused    Intimate partner violence:     Fear of current or ex partner: No     Emotionally abused: No     Physically abused: No     Forced sexual activity: No   Other Topics Concern    None   Social History Narrative    Does not exercise    Seeing a dentist      Medications and Allergies:     Current Outpatient Medications   Medication Sig Dispense Refill    calcium citrate-vitamin D (CITRACAL+D) 315-200 MG-UNIT per tablet Take by mouth      Cholecalciferol (VITAMIN D3) 2000 units capsule Take by mouth      gabapentin (NEURONTIN) 600 MG tablet Take 1 tablet (600 mg total) by mouth 3 (three) times a day 90 tablet 5    mometasone (ELOCON) 0 1 % cream Apply topically daily 45 g 0    umeclidinium-vilanterol (ANORO ELLIPTA) 62 5-25 MCG/INH inhaler Inhale 1 puff daily 1 Inhaler 5     No current facility-administered medications for this visit  Allergies   Allergen Reactions    Penicillins Hives      Immunizations:     Immunization History   Administered Date(s) Administered    INFLUENZA 11/14/2016    Influenza Split High Dose Preservative Free IM 11/14/2016    Pneumococcal Conjugate 13-Valent 01/08/2016    Pneumococcal Polysaccharide PPV23 04/24/2019      Health Maintenance: There are no preventive care reminders to display for this patient  Topic Date Due    DTaP,Tdap,and Td Vaccines (1 - Tdap) 09/10/1949    Influenza Vaccine  07/01/2020      Medicare Health Risk Assessment:     /78 (BP Location: Left arm, Patient Position: Sitting, Cuff Size: Standard)   Pulse 63   Temp 98 3 °F (36 8 °C) (Tympanic)   Resp 16   Ht 4' 11" (1 499 m)   Wt 52 6 kg (116 lb)   SpO2 96%   BMI 23 43 kg/m²      Jasmyn Pablo is here for her Subsequent Wellness visit  Health Risk Assessment:   Patient rates overall health as good  Patient feels that their physical health rating is same  Eyesight was rated as same  Hearing was rated as same  Patient feels that their emotional and mental health rating is same  Pain experienced in the last 7 days has been none  Patient states that she has experienced no weight loss or gain in last 6 months  Depression Screening:   PHQ-2 Score: 0      Urinary Incontinence Screening:   Patient has not leaked urine accidently in the last six months  Home Safety:  Patient does not have trouble with stairs inside or outside of their home  Patient has working smoke alarms and has working carbon monoxide detector  Home safety hazards include: none       Nutrition:   Current diet is Regular and Frequent junk food  Medications:   Patient is not currently taking any over-the-counter supplements  Patient is not able to manage medications  Activities of Daily Living (ADLs)/Instrumental Activities of Daily Living (IADLs):   Walk and transfer into and out of bed and chair?: Yes  Dress and groom yourself?: Yes    Bathe or shower yourself?: Yes    Feed yourself? Yes  Do your laundry/housekeeping?: Yes  Manage your money, pay your bills and track your expenses?: Yes  Make your own meals?: Yes    Do your own shopping?: Yes    Advance Care Planning:   Living will: No    Durable POA for healthcare: No    Advanced directive: No      Comments: Patient has the five wishes at home not complete yet    Cognitive Screening:   Provider or family/friend/caregiver concerned regarding cognition?: No    PREVENTIVE SCREENINGS      Cardiovascular Screening:    General: History Lipid Disorder and Screening Current      Diabetes Screening:     General: Screening Current      Colorectal Cancer Screening:     General: Screening Not Indicated      Breast Cancer Screening:     General: History Breast Cancer and Patient Declines      Cervical Cancer Screening:    General: Screening Not Indicated      Abdominal Aortic Aneurysm (AAA) Screening:        General: Screening Not Indicated      Lung Cancer Screening:     General: Screening Not Indicated      Hepatitis C Screening:    General: Screening Not Indicated    Other Counseling Topics:   Alcohol use counseling and calcium and vitamin D intake and regular weightbearing exercise         Tj Negro MD

## 2023-12-22 ENCOUNTER — APPOINTMENT (OUTPATIENT)
Dept: CARDIOLOGY | Facility: HOSPITAL | Age: 73
End: 2023-12-22
Payer: MEDICARE

## 2023-12-22 VITALS
WEIGHT: 209.8 LBS | RESPIRATION RATE: 18 BRPM | HEART RATE: 82 BPM | TEMPERATURE: 97.9 F | SYSTOLIC BLOOD PRESSURE: 119 MMHG | OXYGEN SATURATION: 94 % | BODY MASS INDEX: 31.07 KG/M2 | HEIGHT: 69 IN | DIASTOLIC BLOOD PRESSURE: 48 MMHG

## 2023-12-22 LAB
ANION GAP SERPL CALCULATED.3IONS-SCNC: 8 MMOL/L (ref 5–15)
BUN SERPL-MCNC: 20 MG/DL (ref 8–23)
BUN/CREAT SERPL: 20 (ref 7–25)
CALCIUM SPEC-SCNC: 8.3 MG/DL (ref 8.6–10.5)
CHLORIDE SERPL-SCNC: 106 MMOL/L (ref 98–107)
CHOLEST SERPL-MCNC: 138 MG/DL (ref 0–200)
CO2 SERPL-SCNC: 25 MMOL/L (ref 22–29)
CREAT SERPL-MCNC: 1 MG/DL (ref 0.76–1.27)
DEPRECATED RDW RBC AUTO: 42.6 FL (ref 37–54)
EGFRCR SERPLBLD CKD-EPI 2021: 80 ML/MIN/1.73
ERYTHROCYTE [DISTWIDTH] IN BLOOD BY AUTOMATED COUNT: 13 % (ref 12.3–15.4)
GLUCOSE SERPL-MCNC: 97 MG/DL (ref 65–99)
HBA1C MFR BLD: 5.3 % (ref 4.8–5.6)
HCT VFR BLD AUTO: 40.5 % (ref 37.5–51)
HDLC SERPL-MCNC: 35 MG/DL (ref 40–60)
HGB BLD-MCNC: 13.2 G/DL (ref 13–17.7)
LDLC SERPL CALC-MCNC: 88 MG/DL (ref 0–100)
LDLC/HDLC SERPL: 2.5 {RATIO}
MCH RBC QN AUTO: 29.1 PG (ref 26.6–33)
MCHC RBC AUTO-ENTMCNC: 32.6 G/DL (ref 31.5–35.7)
MCV RBC AUTO: 89.2 FL (ref 79–97)
PLATELET # BLD AUTO: 235 10*3/MM3 (ref 140–450)
PMV BLD AUTO: 10.5 FL (ref 6–12)
POTASSIUM SERPL-SCNC: 3.9 MMOL/L (ref 3.5–5.2)
RBC # BLD AUTO: 4.54 10*6/MM3 (ref 4.14–5.8)
SODIUM SERPL-SCNC: 139 MMOL/L (ref 136–145)
TRIGL SERPL-MCNC: 77 MG/DL (ref 0–150)
VLDLC SERPL-MCNC: 15 MG/DL (ref 5–40)
WBC NRBC COR # BLD AUTO: 5.35 10*3/MM3 (ref 3.4–10.8)

## 2023-12-22 PROCEDURE — 99239 HOSP IP/OBS DSCHRG MGMT >30: CPT

## 2023-12-22 PROCEDURE — 80061 LIPID PANEL: CPT | Performed by: INTERNAL MEDICINE

## 2023-12-22 PROCEDURE — 85027 COMPLETE CBC AUTOMATED: CPT | Performed by: INTERNAL MEDICINE

## 2023-12-22 PROCEDURE — 93246 EXT ECG>7D<15D RECORDING: CPT

## 2023-12-22 PROCEDURE — A9270 NON-COVERED ITEM OR SERVICE: HCPCS | Performed by: INTERNAL MEDICINE

## 2023-12-22 PROCEDURE — 83036 HEMOGLOBIN GLYCOSYLATED A1C: CPT | Performed by: INTERNAL MEDICINE

## 2023-12-22 PROCEDURE — 63710000001 CLOPIDOGREL 75 MG TABLET: Performed by: INTERNAL MEDICINE

## 2023-12-22 PROCEDURE — 25810000003 SODIUM CHLORIDE 0.9 % SOLUTION: Performed by: INTERNAL MEDICINE

## 2023-12-22 PROCEDURE — 80048 BASIC METABOLIC PNL TOTAL CA: CPT | Performed by: INTERNAL MEDICINE

## 2023-12-22 PROCEDURE — 63710000001 AMLODIPINE 5 MG TABLET: Performed by: INTERNAL MEDICINE

## 2023-12-22 PROCEDURE — 63710000001 ASPIRIN 81 MG TABLET DELAYED-RELEASE: Performed by: INTERNAL MEDICINE

## 2023-12-22 PROCEDURE — 63710000001 ISOSORBIDE MONONITRATE 30 MG TABLET SUSTAINED-RELEASE 24 HOUR: Performed by: INTERNAL MEDICINE

## 2023-12-22 RX ORDER — NITROGLYCERIN 0.4 MG/1
0.4 TABLET SUBLINGUAL
Qty: 30 TABLET | Refills: 1 | Status: SHIPPED | OUTPATIENT
Start: 2023-12-22

## 2023-12-22 RX ORDER — CLOPIDOGREL BISULFATE 75 MG/1
75 TABLET ORAL DAILY
Qty: 30 TABLET | Refills: 11 | Status: SHIPPED | OUTPATIENT
Start: 2023-12-23

## 2023-12-22 RX ORDER — ATORVASTATIN CALCIUM 40 MG/1
40 TABLET, FILM COATED ORAL NIGHTLY
Qty: 90 TABLET | Refills: 3 | Status: SHIPPED | OUTPATIENT
Start: 2023-12-22

## 2023-12-22 RX ADMIN — CLOPIDOGREL BISULFATE 75 MG: 75 TABLET, FILM COATED ORAL at 08:02

## 2023-12-22 RX ADMIN — SODIUM CHLORIDE 75 ML/HR: 9 INJECTION, SOLUTION INTRAVENOUS at 03:47

## 2023-12-22 RX ADMIN — ISOSORBIDE MONONITRATE 30 MG: 30 TABLET, EXTENDED RELEASE ORAL at 06:04

## 2023-12-22 RX ADMIN — ASPIRIN 81 MG: 81 TABLET, COATED ORAL at 08:03

## 2023-12-22 RX ADMIN — AMLODIPINE BESYLATE 5 MG: 5 TABLET ORAL at 08:03

## 2023-12-22 NOTE — DISCHARGE SUMMARY
Date of Discharge:  12/22/2023    Discharge Diagnosis:   Abnormal cardiac CT angiography    CAD (coronary artery disease)      Presenting Problem/History of Present Illness  Abnormal cardiac CT angiography [R93.1]  CAD (coronary artery disease) [I25.10]      Hospital Course  Patient is a 73 y.o. male presented to [facility] on 12/21/2023 for planned coronary angiography.  Patient follows with Dr. Knutson at his [facility].  Patient had been complaining of intermittent chest pain with exertion as well as at rest.  Patient had coronary CTA which showed very heavy of the coronary calcifications.  Because of this Dr. Knutson determined the patient needed coronary angiography.    Patient was taken to the cardiac catheterization lab on 12/21/2023.  Patient had critical stenoses of the large left circumflex that was treated with PTCA and placement of drug-eluting stent.  Patient also had moderate to severe disease of the diagonal branches and moderate stenosis of the LAD.  Ultimately though the remainder of those areas were left for medical management.  He was transferred to the cooin in the telemetry floor with no overnight complaints.    Upon my examination the patient is sitting up comfortably in bed.  He denies any chest pain or any discomfort.  He is tolerating his medicines well.  He has no overt bleeding.  He is stable and ready for discharge.    Review of telemetry overnight shows intermittent second-degree heart block.  Difficult to determine whether this is type I or type II at times.  On the day of discharge the patient appears to be in sinus rhythm with no overt block.  Reviewing documentation from [facility] shows that there have been concerns of a type I block in the past.  They have stopped his metoprolol as of end of October.    Procedures Performed  Procedure(s):  Left Heart Cath       Review of Systems   All other systems reviewed and are negative.      Consults:    Consults       No orders found for last 30 day(s).            Pertinent Test Results:  Lab Results (last 72 hours)       Procedure Component Value Units Date/Time    Basic Metabolic Panel [[id number]]  (Abnormal) Collected: 12/22/23 0410    Specimen: Blood Updated: 12/22/23 0549     Glucose 97 mg/dL      BUN 20 mg/dL      Creatinine 1.00 mg/dL      Sodium 139 mmol/L      Potassium 3.9 mmol/L      Comment: Slight hemolysis detected by analyzer. Result may be falsely elevated.        Chloride 106 mmol/L      CO2 25.0 mmol/L      Calcium 8.3 mg/dL      BUN/Creatinine Ratio 20.0     Anion Gap 8.0 mmol/L      eGFR 80.0 mL/min/1.73     Narrative:      GFR Normal >60  Chronic Kidney Disease <60  Kidney Failure <15    The GFR formula is only valid for adults with stable renal function between ages 18 and 70.    Lipid Panel [[id number]]  (Abnormal) Collected: 12/22/23 0410    Specimen: Blood Updated: 12/22/23 0549     Total Cholesterol 138 mg/dL      Triglycerides 77 mg/dL      HDL Cholesterol 35 mg/dL      LDL Cholesterol  88 mg/dL      VLDL Cholesterol 15 mg/dL      LDL/HDL Ratio 2.50    Narrative:      Cholesterol Reference Ranges  (U.S. Department of Health and Human Services ATP III Classifications)    Desirable          <200 mg/dL  Borderline High    200-239 mg/dL  High Risk          >240 mg/dL      Triglyceride Reference Ranges  (U.S. Department of Health and Human Services ATP III Classifications)    Normal           <150 mg/dL  Borderline High  150-199 mg/dL  High             200-499 mg/dL  Very High        >500 mg/dL    HDL Reference Ranges  (U.S. Department of Health and Human Services ATP III Classifications)    Low     <40 mg/dl (major risk factor for CHD)  High    >60 mg/dl ('negative' risk factor for CHD)        LDL Reference Ranges  (U.S. Department of Health and Human Services ATP III Classifications)    Optimal          <100 mg/dL  Near Optimal     100-129 mg/dL  Borderline High  130-159 mg/dL  High          "    160-189 mg/dL  Very High        >189 mg/dL    Hemoglobin A1c [[id number]]  (Normal) Collected: 12/22/23 0410    Specimen: Blood Updated: 12/22/23 0537     Hemoglobin A1C 5.30 %     Narrative:      Hemoglobin A1C Ranges:    Increased Risk for Diabetes  5.7% to 6.4%  Diabetes                     >= 6.5%  Diabetic Goal                < 7.0%    CBC (No Diff) [[id number]]  (Normal) Collected: 12/22/23 0410    Specimen: Blood Updated: 12/22/23 0526     WBC 5.35 10*3/mm3      RBC 4.54 10*6/mm3      Hemoglobin 13.2 g/dL      Hematocrit 40.5 %      MCV 89.2 fL      MCH 29.1 pg      MCHC 32.6 g/dL      RDW 13.0 %      RDW-SD 42.6 fl      MPV 10.5 fL      Platelets 235 10*3/mm3     Comprehensive Metabolic Panel [[id number]]  (Abnormal) Collected: 12/21/23 0936    Specimen: Blood Updated: 12/21/23 1020     Glucose 117 mg/dL      BUN 22 mg/dL      Creatinine 1.00 mg/dL      Sodium 139 mmol/L      Potassium 3.7 mmol/L      Chloride 101 mmol/L      CO2 25.0 mmol/L      Calcium 8.5 mg/dL      Total Protein 8.2 g/dL      Albumin 4.3 g/dL      ALT (SGPT) 12 U/L      AST (SGOT) 16 U/L      Alkaline Phosphatase 96 U/L      Total Bilirubin 0.4 mg/dL      Globulin 3.9 gm/dL      A/G Ratio 1.1 g/dL      BUN/Creatinine Ratio 22.0     Anion Gap 13.0 mmol/L      eGFR 80.0 mL/min/1.73     Narrative:      GFR Normal >60  Chronic Kidney Disease <60  Kidney Failure <15    The GFR formula is only valid for adults with stable renal function between ages 18 and 70.    CBC (No Diff) [[id number]]  (Normal) Collected: 12/21/23 0936    Specimen: Blood Updated: 12/21/23 0953     WBC 5.91 10*3/mm3      RBC 5.18 10*6/mm3      Hemoglobin 15.1 g/dL      Hematocrit 46.3 %      MCV 89.4 fL      MCH 29.2 pg      MCHC 32.6 g/dL      RDW 13.0 %      RDW-SD 42.6 fl      MPV 10.2 fL      Platelets 256 10*3/mm3             Ejection Fraction  No results found for: \"EF\"    Echo EF Estimated  No results found for: \"ECHOEFEST\"    Nuclear Stress Ejection Fraction  No " "components found for: \"NUCEF\"    Cath Ejection Fraction Quantitative  No results found for: \"CATHEF\"    Condition on Discharge: Stable.  At baseline.    Vital Signs  Temp:  [97 °F (36.1 °C)-98.2 °F (36.8 °C)] 97.9 °F (36.6 °C)  Heart Rate:  [58-82] 82  Resp:  [16-22] 18  BP: (105-143)/() 119/48    Physical Exam:  Constitutional:       Appearance: Healthy appearance. Not in distress.   Neck:      Vascular: JVD normal.   Pulmonary:      Effort: Pulmonary effort is normal.      Breath sounds: Normal breath sounds. No wheezing. No rhonchi. No rales.   Cardiovascular:      PMI at left midclavicular line. Normal rate. Regular rhythm. Normal S1. Normal S2.       Murmurs: There is no murmur.      No gallop.  No click. No rub.      Comments: Right femoral groin site clean dry and intact.  No signs of bruising or hematoma.  Distal pulses palpable.  Edema:     Peripheral edema absent.   Musculoskeletal: Normal range of motion.      Cervical back: Normal range of motion. Skin:     General: Skin is warm and dry.   Neurological:      Mental Status: Alert and oriented to person, place and time.         Discharge Disposition  Home or Self Care    Discharge Medications     Discharge Medications        New Medications        Instructions Start Date   atorvastatin 40 MG tablet  Commonly known as: LIPITOR   40 mg, Oral, Nightly      clopidogrel 75 MG tablet  Commonly known as: PLAVIX   75 mg, Oral, Daily   Start Date: December 23, 2023     nitroglycerin 0.4 MG SL tablet  Commonly known as: NITROSTAT   0.4 mg, Sublingual, Every 5 Minutes PRN, Take no more than 3 doses in 15 minutes.             Continue These Medications        Instructions Start Date   amLODIPine 5 MG tablet  Commonly known as: NORVASC   5 mg, Oral, Daily      [employer] Aspirin Low Dose 81 MG EC tablet  Generic drug: aspirin   81 mg, Oral, Daily      isosorbide mononitrate 30 MG 24 hr tablet  Commonly known as: IMDUR   30 mg, Oral, Every Morning      tamsulosin 0.4 MG " capsule 24 hr capsule  Commonly known as: FLOMAX   1 capsule, Oral, Nightly             Stop These Medications      hydroCHLOROthiazide 25 MG tablet  Commonly known as: HYDRODIURIL     metoprolol succinate XL 25 MG 24 hr tablet  Commonly known as: TOPROL-XL              Discharge Diet: Cardiac diet    Activity at Discharge: Postcardiac catheterization restrictions    Follow-up Appointments  No future appointments.    Patient will have 7-day Zio patch placed at discharge due to intermittent second-degree heart block noted during hospitalization.  Will continue to hold beta-blocker therapy at this time  Patient will have 4-week follow-up with nurse practitioner at [facility] cardiology clinic  Patient will be discharged on home regimen now to include Lipitor 40 mg nightly as well as Plavix 75 mg daily  Patient will be called and as needed nitroglycerin for any chest pain symptoms that occur  Advised the patient to proceed to nearest emergency room if he develops any cardiac symptoms.           Electronically signed by AURELIANO Sauceda, 12/22/23, 9:14 AM CST.     Time spent on discharge: 34 minutes

## 2024-01-15 ENCOUNTER — TELEPHONE (OUTPATIENT)
Dept: CARDIOLOGY | Facility: CLINIC | Age: 74
End: 2024-01-15
Payer: MEDICARE

## 2024-01-15 NOTE — TELEPHONE ENCOUNTER
----- Message from Andreas Knutson MD sent at 1/5/2024  6:00 PM CST -----  Regarding: See me Tuesday, January 9  I can see him on Tuesday as he has intermittent heart block on cardiac monitor

## 2024-01-15 NOTE — TELEPHONE ENCOUNTER
HUB CAN READ    PLEASE CONFIRM IF PATIENT HAS A SCHEDULED APPOINTMENT IN [place]. IF HE DOES NOT PLEASE LET HIM KNOW THAT IT IS VERY IMPORTANT THAT HE SEE SOMEONE HERE OR IN [place].         Patient NO SHOWED APPOINTMENT ON 01/11/2024 I CALLED PATIENT TO SEE IF HE HAD AN APPOINTMENT SCHEDULED IN [place] AND WAS HUNG UP ON I CALLED BACK WITH NO ANSWER SO I LEFT A  AND ASKED HIM TO CALL BACK

## 2024-07-15 ENCOUNTER — OFFICE VISIT (OUTPATIENT)
Dept: CARDIAC SURGERY | Facility: CLINIC | Age: 74
End: 2024-07-15
Payer: MEDICARE

## 2024-07-15 VITALS
HEART RATE: 105 BPM | HEIGHT: 69 IN | SYSTOLIC BLOOD PRESSURE: 128 MMHG | BODY MASS INDEX: 31.01 KG/M2 | DIASTOLIC BLOOD PRESSURE: 80 MMHG | WEIGHT: 209.4 LBS | OXYGEN SATURATION: 96 %

## 2024-07-15 DIAGNOSIS — I77.819 ACQUIRED DILATION OF ASCENDING AORTA AND AORTIC ROOT: Primary | ICD-10-CM

## 2024-07-15 PROBLEM — I77.810 ACQUIRED DILATION OF ASCENDING AORTA AND AORTIC ROOT: Status: ACTIVE | Noted: 2024-07-15

## 2024-07-15 PROCEDURE — 99204 OFFICE O/P NEW MOD 45 MIN: CPT | Performed by: SURGERY

## 2024-07-15 NOTE — PROGRESS NOTES
Cardiothoracic Surgery Consultation    Referring Physician: AURELIANO Edmonds    Primary Care Physician: ELE Schrader    Chief Complaint   Patient presents with    Aortic Aneurysm     New patient from [facility]         Subjective     History of Present Illness  The patient presents for evaluation of an aneurysm. He is accompanied by his granddaughter.    The patient was referred to us by Sandie due to an ascending aortic aneurysm. He has been diagnosed with an aneurysm, as confirmed by a CT scan. He denies the presence of Parkinson's disease. His medical history includes a stent placement performed by Dr. Knutson. He reports occasional chest pain, which appears to have improved post-stent placement.   He smokes half a pack a day.        Review of Systems     A complete review of systems was performed, is negative except stated above.    Past Medical History:   Diagnosis Date    BPH (benign prostatic hyperplasia)     Hypertension     Parkinson disease      Past Surgical History:   Procedure Laterality Date    CARDIAC CATHETERIZATION N/A 12/21/2023    Procedure: Left Heart Cath;  Surgeon: Andreas Knutson MD;  Location:  [facility];  Service: Cardiology;  Laterality: N/A;    TRANSURETHRAL RESECTION OF BLADDER TUMOR N/A 2/6/2017    Procedure: CYSTOSCOPY Bladder biopsies, Fulgaration active bleeding trigon.;  Surgeon: Chuck Venegas MD;  Location:  [facility] OR;  Service:      Family History   Problem Relation Age of Onset    No Known Problems Father     No Known Problems Mother      Social History     Tobacco Use    Smoking status: Some Days     Current packs/day: 1.00     Average packs/day: 1 pack/day for 10.5 years (10.5 ttl pk-yrs)     Types: Cigarettes     Start date: 1/1/2014    Smokeless tobacco: Never   Vaping Use    Vaping status: Never Used   Substance Use Topics    Alcohol use: No    Drug use: Yes     Types: Methamphetamines     Comment: unknown- per pt's family report  "    Current Outpatient Medications   Medication Sig Dispense Refill    amLODIPine (NORVASC) 5 MG tablet Take 1 tablet by mouth Daily.      atorvastatin (LIPITOR) 40 MG tablet Take 1 tablet by mouth Every Night. 90 tablet 3    clopidogrel (PLAVIX) 75 MG tablet Take 1 tablet by mouth Daily. 30 tablet 11    [employer] Aspirin Low Dose 81 MG EC tablet Take 1 tablet by mouth Daily.      isosorbide mononitrate (IMDUR) 30 MG 24 hr tablet Take 1 tablet by mouth Every Morning.      nitroglycerin (NITROSTAT) 0.4 MG SL tablet Place 1 tablet under the tongue Every 5 (Five) Minutes As Needed for Chest Pain (Systolic BP Greater Than 100). Take no more than 3 doses in 15 minutes. 30 tablet 1    tamsulosin (FLOMAX) 0.4 MG capsule 24 hr capsule Take 1 capsule by mouth Every Night.       No current facility-administered medications for this visit.     Allergies:  Patient has no known allergies.    Objective      Vital Signs  Visit Vitals  /80 (BP Location: Right arm, Patient Position: Sitting, Cuff Size: Adult)   Pulse 105   Ht 175.3 cm (69\")   Wt 95 kg (209 lb 6.4 oz)   SpO2 96%   BMI 30.92 kg/m²         Physical Exam  Constitutional:       General: He is not in acute distress.     Appearance: He is well-developed. He is obese. He is ill-appearing. He is not diaphoretic.   HENT:      Head: Normocephalic and atraumatic.      Right Ear: External ear normal.      Left Ear: External ear normal.   Eyes:      General:         Right eye: No discharge.         Left eye: No discharge.      Pupils: Pupils are equal, round, and reactive to light.   Neck:      Vascular: No JVD.      Trachea: No tracheal deviation.   Cardiovascular:      Rate and Rhythm: Normal rate and regular rhythm.      Heart sounds: Normal heart sounds. No murmur heard.  Pulmonary:      Effort: Pulmonary effort is normal. No respiratory distress.      Breath sounds: Normal breath sounds. No stridor. No wheezing.   Abdominal:      General: There is no distension.      " Palpations: Abdomen is soft.      Tenderness: There is no abdominal tenderness. There is no guarding.   Musculoskeletal:         General: No tenderness or deformity. Normal range of motion.      Cervical back: Normal range of motion and neck supple.   Skin:     General: Skin is warm and dry.      Capillary Refill: Capillary refill takes less than 2 seconds.      Coloration: Skin is not pale.      Findings: No erythema or rash.   Neurological:      Mental Status: He is alert and oriented to person, place, and time.      Motor: No abnormal muscle tone.      Coordination: Coordination normal.      Comments: Continuous tardive type movements with tremor of upper extremities   Psychiatric:         Behavior: Behavior normal.         Thought Content: Thought content normal.         Judgment: Judgment normal.        Physical Exam        Results Review:   WBC   Date Value Ref Range Status   12/22/2023 5.35 3.40 - 10.80 10*3/mm3 Final   06/16/2022 5.6 4.1 - 10.9 THOUS/uL Final     RBC   Date Value Ref Range Status   12/22/2023 4.54 4.14 - 5.80 10*6/mm3 Final   06/16/2022 4.28 3.35 - 5.50 MIL/uL Final     Hemoglobin   Date Value Ref Range Status   12/22/2023 13.2 13.0 - 17.7 g/dL Final   06/16/2022 11.8 (L) 12.9 - 16.6 GM/DL Final   07/22/2021 12.6 (L) 14.0 - 18.0 g/dL Final     Hematocrit   Date Value Ref Range Status   12/22/2023 40.5 37.5 - 51.0 % Final   06/16/2022 37.1 (L) 38.0 - 48.0 % Final     MCV   Date Value Ref Range Status   12/22/2023 89.2 79.0 - 97.0 fL Final   06/16/2022 86.7 81.0 - 95.0 FL Final     MCH   Date Value Ref Range Status   12/22/2023 29.1 26.6 - 33.0 pg Final   06/16/2022 27.6 27.0 - 33.0 PG Final     MCHC   Date Value Ref Range Status   12/22/2023 32.6 31.5 - 35.7 g/dL Final   06/16/2022 31.8 (L) 33.0 - 37.0 G/DL Final     RDW   Date Value Ref Range Status   12/22/2023 13.0 12.3 - 15.4 % Final   06/16/2022 14.5 11.5 - 14.5 % Final     RDW-SD   Date Value Ref Range Status   12/22/2023 42.6 37.0 -  54.0 fl Final   06/16/2022 45.6 (H) 35.0 - 42.0 FL Final     MPV   Date Value Ref Range Status   12/22/2023 10.5 6.0 - 12.0 fL Final   06/16/2022 9.5 7.4 - 10.4 FL Final     Platelets   Date Value Ref Range Status   12/22/2023 235 140 - 450 10*3/mm3 Final   06/16/2022 234 130 - 400 THOUS/uL Final     Neutrophil Rel %   Date Value Ref Range Status   06/16/2022 66.7 42.2 - 75.2 % Final     Lymphocyte Rel %   Date Value Ref Range Status   06/16/2022 19.1 (L) 20.5 - 51.1 % Final     Monocyte Rel %   Date Value Ref Range Status   06/16/2022 9.9 (H) 1.7 - 9.3 % Final     Eosinophil Rel %   Date Value Ref Range Status   07/22/2021 5.7 (H) 0.0 - 5.0 % Final     Eosinophil %   Date Value Ref Range Status   06/16/2022 3.2 (H) 1.0 - 3.0 % Final     Basophil Rel %   Date Value Ref Range Status   06/16/2022 0.7 0.0 - 2.0 % Final     Immature Grans %   Date Value Ref Range Status   06/16/2022 0.4 0.0 - 0.4 % Final     Comment:     IG PARAMETER REFLECTS THE  COMBINATION OF METAMYELOCYTES,  MYELOCYTES, AND PROMYELOCYTES.     Neutrophils Absolute   Date Value Ref Range Status   06/16/2022 3.8 1.7 - 8.7 THOUS/uL Final     Lymphocytes Absolute   Date Value Ref Range Status   06/16/2022 1.1 0.8 - 5.6 THOUS/uL Final     Monocytes Absolute   Date Value Ref Range Status   06/16/2022 0.6 0.1 - 1.0 THOUS/uL Final     Eosinophils Absolute   Date Value Ref Range Status   06/16/2022 0.2 0.0 - 0.3 THOUS/uL Final     Basophils Absolute   Date Value Ref Range Status   06/16/2022 0.0 0.0 - 0.2 THOUS/uL Final     Immature Grans, Absolute   Date Value Ref Range Status   06/16/2022 0.02 0.00 - 0.04 THOUS/uL Final     nRBC   Date Value Ref Range Status   06/16/2022 0.0 0 /100 WBC'S Final     Glucose   Date Value Ref Range Status   12/22/2023 97 65 - 99 mg/dL Final     Sodium   Date Value Ref Range Status   12/22/2023 139 136 - 145 mmol/L Final     Potassium   Date Value Ref Range Status   12/22/2023 3.9 3.5 - 5.2 mmol/L Final     Comment:     Slight  hemolysis detected by analyzer. Result may be falsely elevated.     CO2   Date Value Ref Range Status   12/22/2023 25.0 22.0 - 29.0 mmol/L Final     Chloride   Date Value Ref Range Status   12/22/2023 106 98 - 107 mmol/L Final     Anion Gap   Date Value Ref Range Status   12/22/2023 8.0 5.0 - 15.0 mmol/L Final     Creatinine   Date Value Ref Range Status   12/22/2023 1.00 0.76 - 1.27 mg/dL Final     BUN   Date Value Ref Range Status   12/22/2023 20 8 - 23 mg/dL Final     BUN/Creatinine Ratio   Date Value Ref Range Status   12/22/2023 20.0 7.0 - 25.0 Final     Calcium   Date Value Ref Range Status   12/22/2023 8.3 (L) 8.6 - 10.5 mg/dL Final     eGFR Non  Amer   Date Value Ref Range Status   02/05/2017 98 >60 mL/min/1.73 Final     Alkaline Phosphatase   Date Value Ref Range Status   12/21/2023 96 39 - 117 U/L Final     Total Protein   Date Value Ref Range Status   12/21/2023 8.2 6.0 - 8.5 g/dL Final     ALT (SGPT)   Date Value Ref Range Status   12/21/2023 12 1 - 41 U/L Final     AST (SGOT)   Date Value Ref Range Status   12/21/2023 16 1 - 40 U/L Final     Total Bilirubin   Date Value Ref Range Status   12/21/2023 0.4 0.0 - 1.2 mg/dL Final     Albumin   Date Value Ref Range Status   12/21/2023 4.3 3.5 - 5.2 g/dL Final     Globulin   Date Value Ref Range Status   12/21/2023 3.9 gm/dL Final        I reviewed the patient's clinical results and discussed with patient.    Results  I personally reviewed CT scan of chest the following is my interpretation:  Normal branching pattern of the aortic arch, aortic root measures 4 cm in maximum dimension, mid ascending aorta measures 4.3 cm in maximum dimension, there is no evidence of IMH TAINA or dissection.  Descending aorta is normal in caliber.              Assessment & Plan     Assessment & Plan    Mr. Brennan is a 73-year-old male who presents to me with ascending aortic dilation, measured to be 4.3 cm in maximum dimension.  He has many medical comorbidities including  first-degree AV block, coronary disease status post PCI recently, Parkinson's disease with a tar dive type movements, obesity.  I believe overall he would be a poor surgical candidate.    We discussed today with him and his granddaughter the natural history of ascending aortic aneurysms and treatment options.  I recommend continued surveillance and would not consider surgery until he reaches 5.5 cm, even at that size would have to carefully discussed the risk versus benefits of whether to proceed with any surgical intervention given his comorbidities.  He understands this.  He is a smoker we discussed smoking cessation but he is not really interested in quitting.  His blood pressure is typically well-controlled he cannot receive a beta-blocker due to his AV block.    We will see Mr. Brennan back in 1 year with a repeat CTA.  If that is stable we will go to 2-year intervals and can remain in nurse practitioner follow-up unless significant enlargement.  Thank you for trusting me with the care of Mr. Brennan.  Please do not hesitate to call questions or concerns        Claudio Franco MD   Cardiothoracic Surgeon      Patient or patient representative verbalized consent for the use of Ambient Listening during the visit with  Claudio Franco MD for chart documentation. 7/15/2024  14:59 CDT

## 2024-07-15 NOTE — LETTER
July 15, 2024       No Recipients    Patient: Duarte Brennan   YOB: 1950   Date of Visit: 7/15/2024       Dear ELE Newsome,    Duarte Brennan was in my office today. Below are the relevant portions of my assessment and plan of care.    Mr. Brennan is a 73-year-old male who presents to me with ascending aortic dilation, measured to be 4.3 cm in maximum dimension.  He has many medical comorbidities including first-degree AV block, coronary disease status post PCI recently, Parkinson's disease with a tar dive type movements, obesity.  I believe overall he would be a poor surgical candidate.    We discussed today with him and his granddaughter the natural history of ascending aortic aneurysms and treatment options.  I recommend continued surveillance and would not consider surgery until he reaches 5.5 cm, even at that size would have to carefully discussed the risk versus benefits of whether to proceed with any surgical intervention given his comorbidities.  He understands this.  He is a smoker we discussed smoking cessation but he is not really interested in quitting.  His blood pressure is typically well-controlled he cannot receive a beta-blocker due to his AV block.    We will see Mr. Brennan back in 1 year with a repeat CTA.  If that is stable we will go to 2-year intervals and can remain in nurse practitioner follow-up unless significant enlargement.  Thank you for trusting me with the care of Mr. Brennan.  Please do not hesitate to call questions or concerns         Sincerely,        Claudio Franco MD        CC:   No Recipients

## 2024-10-04 ENCOUNTER — OFFICE VISIT (OUTPATIENT)
Dept: CARDIOLOGY | Facility: CLINIC | Age: 74
End: 2024-10-04
Payer: MEDICARE

## 2024-10-04 VITALS
HEIGHT: 69 IN | HEART RATE: 73 BPM | BODY MASS INDEX: 31.84 KG/M2 | DIASTOLIC BLOOD PRESSURE: 74 MMHG | WEIGHT: 215 LBS | OXYGEN SATURATION: 99 % | SYSTOLIC BLOOD PRESSURE: 134 MMHG

## 2024-10-04 DIAGNOSIS — I44.1 MOBITZ TYPE 1 SECOND DEGREE AV BLOCK: Primary | ICD-10-CM

## 2024-10-04 DIAGNOSIS — I44.2 INTERMITTENT COMPLETE HEART BLOCK: ICD-10-CM

## 2024-10-04 NOTE — PROGRESS NOTES
"EP NEW PATIENT VISIT    Chief Complaint  AV BLOCK    Subjective        History of Present Illness    EP Problems:  1.  Mobitz 1 AV block  2.  Intermittent complete heart block, nocturnal  3.  First-degree AV block  4.  Left anterior fascicular block  5.  IVCD    Cardiology Problems:  1.  Hypertension  2.  Ascending aortic aneurysm  3.  CAD status post PCI    Medical Problems:  1.  BPH  2.  Parkinson's disease  3.  Tobacco use disorder  4.  Obesity  5.  Obstructive sleep apnea intolerant of CPAP    Duarte rBennan is a 73 y.o. male with problem list as above who presents to the clinic for evaluation of intermittent complete heart block, Mobitz 1 AV block.  He was admitted to the hospital with intermittent chest pain in December for left heart catheterization.  During the hospitalization, he was noted to have Mobitz 1 AV block seen on telemetry.  He had Holter monitoring performed after discharge which revealed evidence of intermittent complete heart block.  Given these findings, EP was consulted for further evaluation.    Objective   Vital Signs:  /74   Pulse 73   Ht 175.3 cm (69\")   Wt 97.5 kg (215 lb)   SpO2 99%   BMI 31.75 kg/m²   Estimated body mass index is 31.75 kg/m² as calculated from the following:    Height as of this encounter: 175.3 cm (69\").    Weight as of this encounter: 97.5 kg (215 lb).      Physical Exam  Vitals reviewed.   Constitutional:       Appearance: Normal appearance.   Cardiovascular:      Rate and Rhythm: Normal rate and regular rhythm.      Pulses: Normal pulses.      Heart sounds: Normal heart sounds.   Pulmonary:      Effort: Pulmonary effort is normal.      Breath sounds: Normal breath sounds.   Musculoskeletal:         General: No swelling.   Neurological:      Mental Status: He is alert and oriented to person, place, and time.   Psychiatric:         Mood and Affect: Mood normal.         Judgment: Judgment normal.        Result Review :  The following data was reviewed by: " Emily Kern MD on 10/04/2024:  CMP          12/21/2023    09:36 12/22/2023    04:10   CMP   Glucose 117  97    BUN 22  20    Creatinine 1.00  1.00    EGFR 80.0  80.0    Sodium 139  139    Potassium 3.7  3.9    Chloride 101  106    Calcium 8.5  8.3    Total Protein 8.2     Albumin 4.3     Globulin 3.9     Total Bilirubin 0.4     Alkaline Phosphatase 96     AST (SGOT) 16     ALT (SGPT) 12     Albumin/Globulin Ratio 1.1     BUN/Creatinine Ratio 22.0  20.0    Anion Gap 13.0  8.0      CBC          12/21/2023    09:36 12/22/2023    04:10   CBC   WBC 5.91  5.35    RBC 5.18  4.54    Hemoglobin 15.1  13.2    Hematocrit 46.3  40.5    MCV 89.4  89.2    MCH 29.2  29.1    MCHC 32.6  32.6    RDW 13.0  13.0    Platelets 256  235      Holter monitor from 1/5/2024 was directly visualized independently reviewed: Predominantly sinus rhythm, sinus rates appear to be generally acceptable, Mobitz 1 AV block is present throughout the duration of the study.  There is nocturnal intermittent complete heart block which appears to be preceded by Mobitz 1 AV block.  The findings overall are suggestive of physiologic slowing of the AV node.  There are occasional episodes of SVT and NSVT otherwise.        ECG 12 Lead    Date/Time: 10/4/2024 10:11 AM  Performed by: Emily Kern MD    Authorized by: Emily Kern MD  Comparison: compared with previous ECG from 9/18/2023  Comparison to previous ECG: IVCD has replaced left anterior fascicular block  Rhythm: sinus rhythm  Rate: normal  Conduction: non-specific intraventricular conduction delay  QRS axis: left  Other findings: non-specific ST-T wave changes    Clinical impression: abnormal EKG              Assessment and Plan   Diagnoses and all orders for this visit:    1. Mobitz type 1 second degree AV block (Primary)    2. Intermittent complete heart block    Other orders  -     ECG 12 Lead        Duarte Brennan is a 73 y.o. male with problem list as above who presents to the clinic for  evaluation of Mobitz 1 AV block, intermittent complete heart block.  He has evidence of jillian AV conduction slowing at baseline.  His intermittent complete heart block episodes appear to be physiological in nature secondary to further AV jillian slowing.  With this in mind, he lacks any significant symptoms to justify a permanent pacemaker implant at this time.  We discussed the relatively benign nature of jillian AV slowing.  There is certainly some risk of progressive conduction system disease in the future, and he should notify us should he have any worsening symptoms of unexplained dizziness, presyncope, syncope, worsening fatigue or shortness of breath.  This would warrant reevaluation.  Otherwise, plan to see back in clinic in 1 year.      Plan:  -No pacemaker at this time  -Avoid AV jillian blocking medications  -Follow-up in clinic in 1 year  -Call if worsening symptoms occur             Follow Up   Return in about 1 year (around 10/4/2025).  Patient was given instructions and counseling regarding his condition or for health maintenance advice. Please see specific information pulled into the AVS if appropriate.     Part of this note may be an electronic transcription/translation of spoken language to printed text using the Dragon Dictation System.

## 2024-11-06 RX ORDER — CLOPIDOGREL BISULFATE 75 MG/1
75 TABLET ORAL DAILY
Qty: 90 TABLET | Refills: 3 | Status: SHIPPED | OUTPATIENT
Start: 2024-11-06

## 2025-07-07 ENCOUNTER — TELEPHONE (OUTPATIENT)
Dept: CARDIAC SURGERY | Facility: CLINIC | Age: 75
End: 2025-07-07
Payer: MEDICARE

## 2025-07-07 NOTE — TELEPHONE ENCOUNTER
"    Called re: upcoming appointments. No answer. LM.    If pt returns call, please relay:    Relay     \"You are scheduled for the following appointments:    07/14/2025: CT Angiogram Chest  Please check in at the [facility] location at 8:00 AM. Please have nothing to eat or drink for 6 hours prior to this exam. Medications are okay to take with a sip of water. If you wear a diabetic monitoring or medication delivering device, this must be removed prior to this exam.     Address:  [address] [place] [address] [address]  [place] [address]    07/21/2025: Office visit with AURELIANO Singh  Please check in to see AURELIANO Singh at 1:00 PM. Bring a photo ID, insurance card(s) and list of your current medications. She will review your CT results with you during this visit.\"                  "

## 2025-07-14 ENCOUNTER — HOSPITAL ENCOUNTER (OUTPATIENT)
Dept: CT IMAGING | Facility: HOSPITAL | Age: 75
Discharge: HOME | End: 2025-07-14
Admitting: NURSE PRACTITIONER
Payer: MEDICARE

## 2025-07-14 DIAGNOSIS — I77.810 ACQUIRED DILATION OF ASCENDING AORTA AND AORTIC ROOT: ICD-10-CM

## 2025-07-14 PROBLEM — Z87.891 PERSONAL HISTORY OF NICOTINE DEPENDENCE: Status: ACTIVE | Noted: 2025-07-14

## 2025-07-14 PROBLEM — F17.210 CIGARETTE NICOTINE DEPENDENCE WITHOUT COMPLICATION: Status: ACTIVE | Noted: 2025-07-14

## 2025-07-14 PROBLEM — F17.210 CIGARETTE NICOTINE DEPENDENCE WITHOUT COMPLICATION: Chronic | Status: ACTIVE | Noted: 2025-07-14

## 2025-07-14 PROCEDURE — 25510000001 IOPAMIDOL PER 1 ML: Performed by: NURSE PRACTITIONER

## 2025-07-14 PROCEDURE — 71275 CT ANGIOGRAPHY CHEST: CPT

## 2025-07-14 RX ORDER — IOPAMIDOL 755 MG/ML
100 INJECTION, SOLUTION INTRAVASCULAR
Status: COMPLETED | OUTPATIENT
Start: 2025-07-14 | End: 2025-07-14

## 2025-07-14 RX ADMIN — IOPAMIDOL 100 ML: 755 INJECTION, SOLUTION INTRAVENOUS at 08:21

## 2025-07-21 ENCOUNTER — OFFICE VISIT (OUTPATIENT)
Dept: CARDIAC SURGERY | Facility: CLINIC | Age: 75
End: 2025-07-21
Payer: MEDICARE

## 2025-07-21 VITALS
WEIGHT: 210 LBS | DIASTOLIC BLOOD PRESSURE: 80 MMHG | OXYGEN SATURATION: 97 % | BODY MASS INDEX: 31.1 KG/M2 | HEART RATE: 78 BPM | SYSTOLIC BLOOD PRESSURE: 124 MMHG | HEIGHT: 69 IN

## 2025-07-21 DIAGNOSIS — I77.810 ACQUIRED DILATION OF ASCENDING AORTA AND AORTIC ROOT: Primary | ICD-10-CM

## 2025-07-21 DIAGNOSIS — E66.9 OBESITY (BMI 30-39.9): ICD-10-CM

## 2025-07-21 DIAGNOSIS — F17.210 CIGARETTE NICOTINE DEPENDENCE WITHOUT COMPLICATION: Chronic | ICD-10-CM

## 2025-07-21 PROCEDURE — 99214 OFFICE O/P EST MOD 30 MIN: CPT | Performed by: NURSE PRACTITIONER

## 2025-07-21 PROCEDURE — 1159F MED LIST DOCD IN RCRD: CPT | Performed by: NURSE PRACTITIONER

## 2025-07-21 PROCEDURE — 1160F RVW MEDS BY RX/DR IN RCRD: CPT | Performed by: NURSE PRACTITIONER

## 2025-07-21 RX ORDER — LISINOPRIL 20 MG/1
20 TABLET ORAL DAILY
COMMUNITY

## 2025-07-21 NOTE — PROGRESS NOTES
AURELIANO Diaz  [facility]  [address]   [address]            [place] [address]  Phone: [phone]  Fax: [phone]          Chief Complaint  Ascending Aorta Dilation  (Patient is here for follow up w/CT)    Subjective     Duarte Brennan presents to [facility] for appointment.    History of Present Illness  The patient presents for evaluation of an aneurysm.    He reports that his blood pressure has been fluctuating. Approximately 3 months ago, he experienced chest pain for a day and sought cardiology consultation in [place]. Upon returning home, he took nitroglycerin, and the chest pain has not recurred since. He reports no respiratory issues. He attempted to use a BiPAP or CPAP machine but found it intolerable and returned it. He underwent a sleep study 2 years ago at [facility]. He is considering referral for inspire device. No increasing shortness of breath, no chest pain, no heart palpitations, no fever, no chills, no cough with purulent sputum.     SOCIAL HISTORY  Tobacco: The patient smokes cigarettes and is trying to quit.    Objective   Past Medical History:   Diagnosis Date    BPH (benign prostatic hyperplasia)     Hypertension     Parkinson disease    ,   Past Surgical History:   Procedure Laterality Date    CARDIAC CATHETERIZATION N/A 12/21/2023    Procedure: Left Heart Cath;  Surgeon: Andreas Knutson MD;  Location: [facility] CATH INVASIVE LOCATION;  Service: Cardiology;  Laterality: N/A;    TRANSURETHRAL RESECTION OF BLADDER TUMOR N/A 2/6/2017    Procedure: CYSTOSCOPY Bladder biopsies, Fulgaration active bleeding trigon.;  Surgeon: Chuck Venegas MD;  Location: [facility] OR;  Service:    ,   Family History   Problem Relation Age of Onset    No Known Problems Father     No Known Problems Mother    ,   Social History     Tobacco Use    Smoking status: Every Day     Current packs/day: 1.00     Average packs/day: 1 pack/day for 50.6  "years (50.6 ttl pk-yrs)     Types: Cigarettes     Start date: 1975    Smokeless tobacco: Never   Vaping Use    Vaping status: Never Used   Substance Use Topics    Alcohol use: No    Drug use: Not Currently     Types: Methamphetamines     Comment: unknown- per pt's family report   , (Not in a hospital admission)  , Allergies: Patient has no known allergies.    Vital Signs:   /80   Pulse 78   Ht 175.3 cm (69\")   Wt 95.3 kg (210 lb)   SpO2 97%   BMI 31.01 kg/m²        Physical Exam  Vitals reviewed.   Constitutional:       General: He is not in acute distress.     Appearance: He is well-developed. He is obese.   HENT:      Head: Normocephalic and atraumatic.   Eyes:      General: No scleral icterus.     Conjunctiva/sclera: Conjunctivae normal.      Pupils: Pupils are equal, round, and reactive to light.   Cardiovascular:      Rate and Rhythm: Normal rate.   Pulmonary:      Effort: Pulmonary effort is normal. No respiratory distress.      Breath sounds: Normal breath sounds. No wheezing or rales.   Musculoskeletal:         General: Normal range of motion.      Cervical back: Normal range of motion and neck supple.   Skin:     General: Skin is warm and dry.   Neurological:      Mental Status: He is alert and oriented to person, place, and time.   Psychiatric:         Behavior: Behavior normal.         Thought Content: Thought content normal.         Judgment: Judgment normal.        Result Review :  The following data was reviewed by: AURELIANO Dey on 07/21/2025:    CT Angiogram Chest (07/14/2025 08:21)   IMPRESSION:  Mildly dilated ascending thoracic aorta measuring up to 4 cm.  Mild emphysema. Consider enrolling in the low-dose CT cancer screening program if meets criteria.    No results found for this or any previous visit.             Assessment and Plan  Diagnoses and all orders for this visit:    1. 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 Descending aorta is normal in caliber. Dr. Franco    Assessment & Plan:  I have personally reviewed CTA of the chest performed 7/14/25 and the following is my interpretation:  Mid ascending aorta measures 4.0 cm in maximum dimension in axial view, the distal ascending/proximal arch measures 3.5 cm in axial view, aortic root measures 3.5 cm in axial and sagittal views, 3.6 cm in coronal view.   No evidence of IMH, TAINA, or dissection.     We discussed the natural course history of aortic aneurysmal disease. We discussed the specific size of aneurysm today and potential risk of aortic complications. We discussed the operative treatment of aneursymal disease broadly. We discussed the recommendation to plan surveillance with CT scans. We discussed signs and symptoms of acute aortic pathology and the need to present to the emergency department for further evaluation. Lastly, we discussed the value of exercise while being mindful of a known aneurysm and the potential risk that high intensity, isometric, or valsalva type exercises presents.   Potential medical therapy including the use of a beta-blocker and perhaps other agents to accomplish strict control of pressure were discussed.     Obtain f/u CTA chest in 2 years.         Orders:  -     CT Angiogram Chest; Future    2. Cigarette nicotine dependence without complication  Assessment & Plan:  Duarte Brennan  reports that he has been smoking cigarettes. He started smoking about 50 years ago. He has a 50.6 pack-year smoking history. He has never used smokeless tobacco. I have educated him on the risk of diseases from using tobacco products such as cancer, COPD, and heart disease.     I advised him to quit and he acknowledges the need to quit smoking.      I spent 3  minutes  counseling the patient.       Tobacco use is unchanged.  Smoking cessation counseling was provided.  Tobacco use will be reassessed in 1 year.    Obtain low dose CT chest in 1 year.     Orders:  -      CT Chest Low Dose Cancer Screening WO; Future    3. Obesity (BMI 30-39.9)  Assessment & Plan:  Patient's (Body mass index is 31.01 kg/m².) Recommend weight loss, defer to PCP.         Follow Up  AURELIANO Dey  7/21/2025  15:28 CDT    Return in about 1 year (around 7/21/2026) for with OPAL [place].    Patient was given instructions and counseling regarding his condition or for health maintenance advice. Please see specific information pulled into the AVS if appropriate.     Please note that portions of this note were completed with a voice recognition program.    Patient or patient representative verbalized consent for the use of Ambient Listening during the visit with  AURELIANO Dey for chart documentation. 7/21/2025  15:13 CDT      Note to Patient:   The 21st Century Cures Act makes medical notes like this available to patients in the interest of transparency; however, please be advised this is a medical document.  It is intended as [id number] communication.  It is written in medical language and may contain abbreviations or verbiage that are unfamiliar.  It may appear blunt or direct.  Medical documents are intended to carry relevant information, facts as evident, and the clinical opinion of the practitioner.  This note may have been transcribed using a voice dictation system.  Voice-recognition errors may occur.  This should not be taken to alter the content or meaning of this note.

## 2025-07-21 NOTE — ASSESSMENT & PLAN NOTE
Duarte Brennan  reports that he has been smoking cigarettes. He started smoking about 50 years ago. He has a 50.6 pack-year smoking history. He has never used smokeless tobacco. I have educated him on the risk of diseases from using tobacco products such as cancer, COPD, and heart disease.     I advised him to quit and he acknowledges the need to quit smoking.      I spent 3  minutes counseling the patient.       Tobacco use is unchanged.  Smoking cessation counseling was provided.  Tobacco use will be reassessed in 1 year.    Obtain low dose CT chest in 1 year.

## 2025-07-21 NOTE — ASSESSMENT & PLAN NOTE
I have personally reviewed CTA of the chest performed 7/14/25 and the following is my interpretation:  Mid ascending aorta measures 4.0 cm in maximum dimension in axial view, the distal ascending/proximal arch measures 3.5 cm in axial view, aortic root measures 3.5 cm in axial and sagittal views, 3.6 cm in coronal view.   No evidence of IMH, TAINA, or dissection.     We discussed the natural course history of aortic aneurysmal disease. We discussed the specific size of aneurysm today and potential risk of aortic complications. We discussed the operative treatment of aneursymal disease broadly. We discussed the recommendation to plan surveillance with CT scans. We discussed signs and symptoms of acute aortic pathology and the need to present to the emergency department for further evaluation. Lastly, we discussed the value of exercise while being mindful of a known aneurysm and the potential risk that high intensity, isometric, or valsalva type exercises presents.   Potential medical therapy including the use of a beta-blocker and perhaps other agents to accomplish strict control of pressure were discussed.     Obtain f/u CTA chest in 2 years.

## (undated) DEVICE — KT NDL GUIDE STRL 18GA

## (undated) DEVICE — DRAPE,ANGIO,BRACH,STERILE,38X44: Brand: MEDLINE

## (undated) DEVICE — RADIFOCUS OPTITORQUE ANGIOGRAPHIC CATHETER: Brand: OPTITORQUE

## (undated) DEVICE — SOL IRR NACL 0.9PCT BT 1000ML

## (undated) DEVICE — MODEL AT P65, P/N 701554-001KIT CONTENTS: HAND CONTROLLER, 3-WAY HIGH-PRESSURE STOPCOCK WITH ROTATING END AND PREMIUM HIGH-PRESSURE TUBING: Brand: ANGIOTOUCH® KIT

## (undated) DEVICE — GC 7F 078 JL 4: Brand: VISTA BRITE TIP

## (undated) DEVICE — PINNACLE INTRODUCER SHEATH: Brand: PINNACLE

## (undated) DEVICE — GLV SURG BIOGEL M LTX PF 8

## (undated) DEVICE — SYR CATH/TIP 50ML 2OZ STRL 1P/U

## (undated) DEVICE — MODEL BT2000 P/N 700287-012KIT CONTENTS: MANIFOLD WITH SALINE AND CONTRAST PORTS, SALINE TUBING WITH SPIKE AND HAND SYRINGE, TRANSDUCER: Brand: BT2000 AUTOMATED MANIFOLD KIT

## (undated) DEVICE — PAD, DEFIB, ADULT, RADIOTRANS, PHYSIO: Brand: MEDLINE

## (undated) DEVICE — INFLATION DEVICE: Brand: ENCORE™ 26

## (undated) DEVICE — GLIDESHEATH SLENDER STAINLESS STEEL KIT: Brand: GLIDESHEATH SLENDER

## (undated) DEVICE — 60 ML SYRINGE,TOOMEY TYPE: Brand: MONOJECT

## (undated) DEVICE — PAD GRND REM POLYHESIVE A/ DISP

## (undated) DEVICE — GW ZIPWIRE STD ANGL .035IN 150CM

## (undated) DEVICE — DRSNG PRESS SAFEGUARD

## (undated) DEVICE — PK CATH CARD 30 CA/4

## (undated) DEVICE — GW STARTER FXD CORE J .035 3X260CM 3MM

## (undated) DEVICE — FR5 INFINITI MULTIPAC: Brand: INFINITI

## (undated) DEVICE — CVR BRD ARM 13X30

## (undated) DEVICE — WIPE THERAWASH SLV SPEC CARE 2PK

## (undated) DEVICE — TOOL INSRT GW MTL OR PLSTC

## (undated) DEVICE — TR BAND RADIAL ARTERY COMPRESSION DEVICE: Brand: TR BAND

## (undated) DEVICE — GW PRESSUREWIRE X WIRELESS FFR 175CM

## (undated) DEVICE — 6F .070 3 DRC 100CM: Brand: VISTA BRITE TIP

## (undated) DEVICE — DEV TORQ GW HOT/PINK

## (undated) DEVICE — GOWN,NON-REINFORCED,SIRUS,SET IN SLV,XXL: Brand: MEDLINE

## (undated) DEVICE — CATH URETRL OPN/END 5F70CM

## (undated) DEVICE — HI-TORQUE POWERTURN FLEX GUIDE WIRE W/HYDROPHILIC COATING .014" STRAIGHT TIP 190 CM: Brand: HI-TORQUE POWERTURN

## (undated) DEVICE — SOLIDIFIER LIQUI LOC PLUS 2000CC

## (undated) DEVICE — PK CYSTO 30

## (undated) DEVICE — ADAPT CYSTO FOR SYR TO SHEATH

## (undated) DEVICE — COPILOT BLEEDBACK CONTROL VALVE: Brand: COPILOT

## (undated) DEVICE — TREK CORONARY DILATATION CATHETER 3.0 MM X 20 MM / RAPID-EXCHANGE: Brand: TREK

## (undated) DEVICE — CANN NASL ETCO2 LO/FLO A/

## (undated) DEVICE — SUP ARMBRD ART/LINE BLU

## (undated) DEVICE — PTCH HEMOCON PRO 2X2IN

## (undated) DEVICE — PERCLOSE™ PROSTYLE™ SUTURE-MEDIATED CLOSURE AND REPAIR SYSTEM: Brand: PERCLOSE™ PROSTYLE™

## (undated) DEVICE — GUIDELINER CATHETERS ARE INTENDED TO BE USED IN CONJUNCTION WITH GUIDE CATHETERS TO ACCESS DISCRETE REGIONS OF THE CORONARY AND/OR PERIPHERAL VASCULATURE, AND TO FACILITATE PLACEMENT OF INTERVENTIONAL DEVICES.: Brand: GUIDELINER® V3 CATHETER

## (undated) DEVICE — PK TURNOVER CYSTO RM